# Patient Record
Sex: FEMALE | NOT HISPANIC OR LATINO | Employment: UNEMPLOYED | URBAN - NONMETROPOLITAN AREA
[De-identification: names, ages, dates, MRNs, and addresses within clinical notes are randomized per-mention and may not be internally consistent; named-entity substitution may affect disease eponyms.]

---

## 2023-07-12 LAB
ALANINE AMINOTRANSFERASE (SGPT) (U/L) IN SER/PLAS: 11 U/L (ref 7–45)
ALBUMIN (G/DL) IN SER/PLAS: 3.9 G/DL (ref 3.4–5)
ALKALINE PHOSPHATASE (U/L) IN SER/PLAS: 68 U/L (ref 33–110)
ANION GAP IN SER/PLAS: 15 MMOL/L (ref 10–20)
ASPARTATE AMINOTRANSFERASE (SGOT) (U/L) IN SER/PLAS: 11 U/L (ref 9–39)
BASOPHILS (10*3/UL) IN BLOOD BY AUTOMATED COUNT: 0.04 X10E9/L (ref 0–0.1)
BASOPHILS/100 LEUKOCYTES IN BLOOD BY AUTOMATED COUNT: 0.3 % (ref 0–2)
BILIRUBIN TOTAL (MG/DL) IN SER/PLAS: 0.4 MG/DL (ref 0–1.2)
C REACTIVE PROTEIN (MG/L) IN SER/PLAS: 5.54 MG/DL
CALCIUM (MG/DL) IN SER/PLAS: 8.9 MG/DL (ref 8.6–10.3)
CARBON DIOXIDE, TOTAL (MMOL/L) IN SER/PLAS: 23 MMOL/L (ref 21–32)
CHLORIDE (MMOL/L) IN SER/PLAS: 103 MMOL/L (ref 98–107)
CHOLESTEROL (MG/DL) IN SER/PLAS: 137 MG/DL (ref 0–199)
CHOLESTEROL IN HDL (MG/DL) IN SER/PLAS: 57.8 MG/DL
CHOLESTEROL/HDL RATIO: 2.4
CHORIOGONADOTROPIN (MIU/ML) IN SER/PLAS: <2 MIU/ML
CREATININE (MG/DL) IN SER/PLAS: 0.7 MG/DL (ref 0.5–1.05)
EOSINOPHILS (10*3/UL) IN BLOOD BY AUTOMATED COUNT: 0.11 X10E9/L (ref 0–0.7)
EOSINOPHILS/100 LEUKOCYTES IN BLOOD BY AUTOMATED COUNT: 0.9 % (ref 0–6)
ERYTHROCYTE DISTRIBUTION WIDTH (RATIO) BY AUTOMATED COUNT: 16.8 % (ref 11.5–14.5)
ERYTHROCYTE MEAN CORPUSCULAR HEMOGLOBIN CONCENTRATION (G/DL) BY AUTOMATED: 30.7 G/DL (ref 32–36)
ERYTHROCYTE MEAN CORPUSCULAR VOLUME (FL) BY AUTOMATED COUNT: 81 FL (ref 80–100)
ERYTHROCYTES (10*6/UL) IN BLOOD BY AUTOMATED COUNT: 4.3 X10E12/L (ref 4–5.2)
GFR FEMALE: >90 ML/MIN/1.73M2
GLUCOSE (MG/DL) IN SER/PLAS: 76 MG/DL (ref 74–99)
HEMATOCRIT (%) IN BLOOD BY AUTOMATED COUNT: 34.9 % (ref 36–46)
HEMOGLOBIN (G/DL) IN BLOOD: 10.7 G/DL (ref 12–16)
IMMATURE GRANULOCYTES/100 LEUKOCYTES IN BLOOD BY AUTOMATED COUNT: 0.4 % (ref 0–0.9)
IRON (UG/DL) IN SER/PLAS: 17 UG/DL (ref 35–150)
IRON BINDING CAPACITY (UG/DL) IN SER/PLAS: 374 UG/DL (ref 240–445)
IRON SATURATION (%) IN SER/PLAS: 5 % (ref 25–45)
LDL: 61 MG/DL (ref 0–99)
LEUKOCYTES (10*3/UL) IN BLOOD BY AUTOMATED COUNT: 11.6 X10E9/L (ref 4.4–11.3)
LYMPHOCYTES (10*3/UL) IN BLOOD BY AUTOMATED COUNT: 1.27 X10E9/L (ref 1.2–4.8)
LYMPHOCYTES/100 LEUKOCYTES IN BLOOD BY AUTOMATED COUNT: 11 % (ref 13–44)
MONOCYTES (10*3/UL) IN BLOOD BY AUTOMATED COUNT: 0.91 X10E9/L (ref 0.1–1)
MONOCYTES/100 LEUKOCYTES IN BLOOD BY AUTOMATED COUNT: 7.9 % (ref 2–10)
NATRIURETIC PEPTIDE B (PG/ML) IN SER/PLAS: 31 PG/ML (ref 0–99)
NEUTROPHILS (10*3/UL) IN BLOOD BY AUTOMATED COUNT: 9.21 X10E9/L (ref 1.2–7.7)
NEUTROPHILS/100 LEUKOCYTES IN BLOOD BY AUTOMATED COUNT: 79.5 % (ref 40–80)
PLATELETS (10*3/UL) IN BLOOD AUTOMATED COUNT: 318 X10E9/L (ref 150–450)
POTASSIUM (MMOL/L) IN SER/PLAS: 4.3 MMOL/L (ref 3.5–5.3)
PROTEIN TOTAL: 6.8 G/DL (ref 6.4–8.2)
SEDIMENTATION RATE, ERYTHROCYTE: 40 MM/H (ref 0–20)
SODIUM (MMOL/L) IN SER/PLAS: 137 MMOL/L (ref 136–145)
TRIGLYCERIDE (MG/DL) IN SER/PLAS: 93 MG/DL (ref 0–149)
UREA NITROGEN (MG/DL) IN SER/PLAS: 19 MG/DL (ref 6–23)
VLDL: 19 MG/DL (ref 0–40)

## 2023-07-13 LAB
ANTI-NUCLEAR ANTIBODY (ANA): NEGATIVE
CALCIDIOL (25 OH VITAMIN D3) (NG/ML) IN SER/PLAS: 29 NG/ML
COBALAMIN (VITAMIN B12) (PG/ML) IN SER/PLAS: 555 PG/ML (ref 211–911)
CYTOPLASMIC PATTERN: PRESENT
EBV INTERPRETATION: ABNORMAL
EPSTEIN-BARR VCA IGG: POSITIVE
EPSTEIN-BARR VCA IGG: POSITIVE
EPSTEIN-BARR VCA IGM: NEGATIVE
EPSTEIN-BARR VCA IGM: NEGATIVE
EPSTEIN-BARR VIRUS EARLY ANTIGEN ANTIBODY, IGG: NEGATIVE
EPSTIEN-BARR NUCLEAR ANTIGEN AB: POSITIVE
ESTIMATED AVERAGE GLUCOSE FOR HBA1C: 105 MG/DL
FOLLITROPIN (IU/L) IN SER/PLAS: 8.1 IU/L
HEMOGLOBIN A1C/HEMOGLOBIN TOTAL IN BLOOD: 5.3 %
STREPTOLYSIN O AB (IU/ML) IN SER/PLAS: <20 IU/ML (ref 0–200)

## 2023-07-15 LAB — B. BURGDORFERI VLSE1/PEPC10 ABS, ELISA: 0.36 IV

## 2023-11-27 ENCOUNTER — TELEPHONE (OUTPATIENT)
Dept: PRIMARY CARE | Facility: CLINIC | Age: 43
End: 2023-11-27
Payer: COMMERCIAL

## 2023-11-27 NOTE — TELEPHONE ENCOUNTER
Pt called office stating she needs a refill for metformin but would like to try something else for her weight loss. Pt is wondering what you would suggest?

## 2023-11-29 PROBLEM — E03.9 ACQUIRED HYPOTHYROIDISM: Status: ACTIVE | Noted: 2023-11-29

## 2023-11-29 PROBLEM — E88.810 METABOLIC SYNDROME: Status: ACTIVE | Noted: 2023-11-29

## 2023-11-29 PROBLEM — E55.9 VITAMIN D DEFICIENCY: Status: ACTIVE | Noted: 2023-11-29

## 2023-11-29 PROBLEM — D50.9 IRON DEFICIENCY ANEMIA: Status: ACTIVE | Noted: 2023-11-29

## 2023-11-29 PROBLEM — F41.9 ANXIETY: Status: ACTIVE | Noted: 2023-11-29

## 2023-11-29 PROBLEM — K57.92 DIVERTICULITIS: Status: ACTIVE | Noted: 2023-11-29

## 2023-12-01 ENCOUNTER — APPOINTMENT (OUTPATIENT)
Dept: PRIMARY CARE | Facility: CLINIC | Age: 43
End: 2023-12-01
Payer: COMMERCIAL

## 2023-12-07 PROBLEM — K42.9 UMBILICAL HERNIA: Status: ACTIVE | Noted: 2023-12-07

## 2023-12-07 PROBLEM — I83.90 SUPERFICIAL VARICOSITIES: Status: ACTIVE | Noted: 2023-12-07

## 2023-12-07 RX ORDER — METFORMIN HYDROCHLORIDE 500 MG/1
1 TABLET ORAL
COMMUNITY
End: 2023-12-12 | Stop reason: SDUPTHER

## 2023-12-07 RX ORDER — ONDANSETRON 4 MG/1
4 TABLET, FILM COATED ORAL EVERY 8 HOURS PRN
COMMUNITY
Start: 2023-07-16 | End: 2023-12-12 | Stop reason: ALTCHOICE

## 2023-12-07 RX ORDER — LEVOTHYROXINE SODIUM 175 UG/1
1 TABLET ORAL DAILY
COMMUNITY
Start: 2016-09-29 | End: 2023-12-12 | Stop reason: SDUPTHER

## 2023-12-07 RX ORDER — TRAZODONE HYDROCHLORIDE 50 MG/1
1 TABLET ORAL NIGHTLY PRN
COMMUNITY
Start: 2019-07-03 | End: 2023-12-12 | Stop reason: SDUPTHER

## 2023-12-07 RX ORDER — IBUPROFEN 200 MG
1 TABLET ORAL DAILY
COMMUNITY
Start: 2023-07-16 | End: 2023-12-12 | Stop reason: ALTCHOICE

## 2023-12-07 RX ORDER — METFORMIN HYDROCHLORIDE 500 MG/1
TABLET ORAL EVERY 12 HOURS
COMMUNITY
Start: 2023-04-27 | End: 2023-12-12 | Stop reason: SDUPTHER

## 2023-12-07 RX ORDER — FLUOXETINE HYDROCHLORIDE 20 MG/1
3 CAPSULE ORAL DAILY
COMMUNITY
Start: 2018-12-03 | End: 2024-01-09 | Stop reason: SDUPTHER

## 2023-12-12 ENCOUNTER — OFFICE VISIT (OUTPATIENT)
Dept: PRIMARY CARE | Facility: CLINIC | Age: 43
End: 2023-12-12
Payer: COMMERCIAL

## 2023-12-12 VITALS
TEMPERATURE: 98 F | OXYGEN SATURATION: 99 % | HEIGHT: 63 IN | HEART RATE: 61 BPM | BODY MASS INDEX: 35.47 KG/M2 | SYSTOLIC BLOOD PRESSURE: 112 MMHG | DIASTOLIC BLOOD PRESSURE: 66 MMHG | WEIGHT: 200.2 LBS

## 2023-12-12 DIAGNOSIS — E66.09 CLASS 2 OBESITY DUE TO EXCESS CALORIES WITHOUT SERIOUS COMORBIDITY WITH BODY MASS INDEX (BMI) OF 35.0 TO 35.9 IN ADULT: ICD-10-CM

## 2023-12-12 DIAGNOSIS — E88.810 METABOLIC SYNDROME: Primary | ICD-10-CM

## 2023-12-12 DIAGNOSIS — E03.9 ACQUIRED HYPOTHYROIDISM: ICD-10-CM

## 2023-12-12 DIAGNOSIS — F41.9 ANXIETY: ICD-10-CM

## 2023-12-12 DIAGNOSIS — F51.01 PRIMARY INSOMNIA: ICD-10-CM

## 2023-12-12 PROCEDURE — 1036F TOBACCO NON-USER: CPT | Performed by: NURSE PRACTITIONER

## 2023-12-12 PROCEDURE — 99214 OFFICE O/P EST MOD 30 MIN: CPT | Performed by: NURSE PRACTITIONER

## 2023-12-12 PROCEDURE — 3008F BODY MASS INDEX DOCD: CPT | Performed by: NURSE PRACTITIONER

## 2023-12-12 RX ORDER — PHENTERMINE HYDROCHLORIDE 37.5 MG/1
37.5 TABLET ORAL
Qty: 30 TABLET | Refills: 0 | Status: SHIPPED | OUTPATIENT
Start: 2023-12-12 | End: 2024-01-09 | Stop reason: SDUPTHER

## 2023-12-12 RX ORDER — TRAZODONE HYDROCHLORIDE 50 MG/1
50 TABLET ORAL NIGHTLY PRN
Qty: 90 TABLET | Refills: 3 | Status: SHIPPED | OUTPATIENT
Start: 2023-12-12 | End: 2024-12-11

## 2023-12-12 RX ORDER — METFORMIN HYDROCHLORIDE 500 MG/1
1000 TABLET ORAL
Qty: 180 TABLET | Refills: 3 | Status: SHIPPED | OUTPATIENT
Start: 2023-12-12

## 2023-12-12 RX ORDER — LEVOTHYROXINE SODIUM 175 UG/1
175 TABLET ORAL DAILY
Qty: 90 TABLET | Refills: 3 | Status: SHIPPED | OUTPATIENT
Start: 2023-12-12 | End: 2024-01-25 | Stop reason: ALTCHOICE

## 2023-12-12 ASSESSMENT — LIFESTYLE VARIABLES
HOW OFTEN DO YOU HAVE A DRINK CONTAINING ALCOHOL: NEVER
AUDIT-C TOTAL SCORE: 0
HOW MANY STANDARD DRINKS CONTAINING ALCOHOL DO YOU HAVE ON A TYPICAL DAY: PATIENT DOES NOT DRINK
HOW OFTEN DO YOU HAVE SIX OR MORE DRINKS ON ONE OCCASION: NEVER
SKIP TO QUESTIONS 9-10: 1

## 2023-12-12 ASSESSMENT — ENCOUNTER SYMPTOMS
RESPIRATORY NEGATIVE: 1
PSYCHIATRIC NEGATIVE: 1
DEPRESSION: 0
LOSS OF SENSATION IN FEET: 0
EYES NEGATIVE: 1
GASTROINTESTINAL NEGATIVE: 1
CONSTITUTIONAL NEGATIVE: 1
OCCASIONAL FEELINGS OF UNSTEADINESS: 0
NEUROLOGICAL NEGATIVE: 1
CARDIOVASCULAR NEGATIVE: 1
ALLERGIC/IMMUNOLOGIC NEGATIVE: 1
MUSCULOSKELETAL NEGATIVE: 1
ENDOCRINE NEGATIVE: 1

## 2023-12-12 ASSESSMENT — PATIENT HEALTH QUESTIONNAIRE - PHQ9
2. FEELING DOWN, DEPRESSED OR HOPELESS: NOT AT ALL
SUM OF ALL RESPONSES TO PHQ9 QUESTIONS 1 AND 2: 0
1. LITTLE INTEREST OR PLEASURE IN DOING THINGS: NOT AT ALL

## 2023-12-12 NOTE — PROGRESS NOTES
Seen in collaboration with nurse practitioner student.  Assessment and HPI updated to reflect my assessment and HPI.

## 2023-12-12 NOTE — PROGRESS NOTES
"Subjective   Patient ID: Tiny Malik is a 43 y.o. female who presents for Follow-up.    Pt presents for discussion on weight loss. Pt sees a  once a week. Pt stopped her metformin three weeks ago and feels like it is not doing anything and would like to try an injection for weight loss.  Patient denies any active participation and exercise.  She does note that she has been trying to cut back on her intake.  Anecdotally she notes that since stopping her metformin she has had an increase in appetite.  After discussion of different available weight loss medications, cost, side effects and potential actions.  She is agreeable to try Adipex.  Reviewed importance of food choices exercise and medication in the weight loss journey.  She verbalizes understanding.  Of note she has lost approximately 81 pounds in the last 2 years.  Celebrated this accomplishment with patient.         Review of Systems   Constitutional: Negative.    HENT: Negative.     Eyes: Negative.    Respiratory: Negative.     Cardiovascular: Negative.    Gastrointestinal: Negative.    Endocrine: Negative.    Genitourinary: Negative.    Musculoskeletal: Negative.    Skin: Negative.    Allergic/Immunologic: Negative.    Neurological: Negative.    Psychiatric/Behavioral: Negative.         Objective   /66 (BP Location: Right arm, Patient Position: Sitting)   Pulse 61   Temp 36.7 °C (98 °F) (Temporal)   Ht 1.6 m (5' 3\")   Wt 90.8 kg (200 lb 3.2 oz)   SpO2 99%   BMI 35.46 kg/m²     Physical Exam  Constitutional:       Appearance: Normal appearance.   HENT:      Head: Normocephalic and atraumatic.      Right Ear: Tympanic membrane normal.      Left Ear: Tympanic membrane normal.      Nose: Nose normal.      Mouth/Throat:      Mouth: Mucous membranes are moist.   Eyes:      Pupils: Pupils are equal, round, and reactive to light.   Cardiovascular:      Rate and Rhythm: Regular rhythm.      Pulses: Normal pulses.      Heart sounds: " Normal heart sounds.   Pulmonary:      Effort: Pulmonary effort is normal.      Breath sounds: Normal breath sounds.   Abdominal:      General: Abdomen is flat.      Palpations: Abdomen is soft.   Musculoskeletal:         General: Normal range of motion.      Cervical back: Normal range of motion.   Skin:     General: Skin is warm.      Capillary Refill: Capillary refill takes less than 2 seconds.   Neurological:      General: No focal deficit present.      Mental Status: She is alert and oriented to person, place, and time.   Psychiatric:         Mood and Affect: Mood normal.         Behavior: Behavior normal.         Thought Content: Thought content normal.         Judgment: Judgment normal.       Assessment/Plan   Problem List Items Addressed This Visit             ICD-10-CM    Acquired hypothyroidism E03.9    Relevant Medications    levothyroxine (Synthroid, Levoxyl) 175 mcg tablet    Anxiety F41.9    Metabolic syndrome - Primary E88.810    Relevant Medications    metFORMIN (Glucophage) 500 mg tablet     Other Visit Diagnoses         Codes    Class 2 obesity due to excess calories without serious comorbidity with body mass index (BMI) of 35.0 to 35.9 in adult     E66.09, Z68.35    Relevant Medications    metFORMIN (Glucophage) 500 mg tablet    phentermine (Adipex-P) 37.5 mg tablet    Primary insomnia     F51.01    Relevant Medications    traZODone (Desyrel) 50 mg tablet        Focus on healthy eating including lean proteins and vegetables.  Avoid high carbohydrate high sugar foods and drinks.  Try to increase physical activity as tolerated.  Goal is for 160 minutes/week of physical activity.  Start Adipex once a day.   Continue metformin

## 2023-12-12 NOTE — PATIENT INSTRUCTIONS
Focus on healthy eating including lean proteins and vegetables.  Avoid high carbohydrate high sugar foods and drinks.  Try to increase physical activity as tolerated.  Goal is for 160 minutes/week of physical activity.  Start Adipex once a day.   Continue metformin

## 2024-01-09 DIAGNOSIS — E66.09 CLASS 2 OBESITY DUE TO EXCESS CALORIES WITHOUT SERIOUS COMORBIDITY WITH BODY MASS INDEX (BMI) OF 35.0 TO 35.9 IN ADULT: ICD-10-CM

## 2024-01-09 DIAGNOSIS — F41.9 ANXIETY: Primary | ICD-10-CM

## 2024-01-09 RX ORDER — PHENTERMINE HYDROCHLORIDE 37.5 MG/1
37.5 TABLET ORAL
Qty: 30 TABLET | Refills: 0 | Status: SHIPPED | OUTPATIENT
Start: 2024-01-09 | End: 2024-02-14 | Stop reason: SDUPTHER

## 2024-01-09 RX ORDER — FLUOXETINE HYDROCHLORIDE 20 MG/1
60 CAPSULE ORAL DAILY
Qty: 90 CAPSULE | Refills: 11 | Status: SHIPPED | OUTPATIENT
Start: 2024-01-09 | End: 2025-01-08

## 2024-01-09 NOTE — TELEPHONE ENCOUNTER
Last DOS 12-12-23 next DOS 1-12-24   conducted a detailed discussion... I had a detailed discussion with the patient and/or guardian regarding the historical points, exam findings, and any diagnostic results supporting the discharge/admit diagnosis.

## 2024-01-18 ENCOUNTER — APPOINTMENT (OUTPATIENT)
Dept: PRIMARY CARE | Facility: CLINIC | Age: 44
End: 2024-01-18
Payer: COMMERCIAL

## 2024-01-18 ENCOUNTER — OFFICE VISIT (OUTPATIENT)
Dept: PRIMARY CARE | Facility: CLINIC | Age: 44
End: 2024-01-18
Payer: COMMERCIAL

## 2024-01-18 VITALS
WEIGHT: 184.4 LBS | BODY MASS INDEX: 32.67 KG/M2 | HEIGHT: 63 IN | OXYGEN SATURATION: 97 % | TEMPERATURE: 98.4 F | HEART RATE: 71 BPM | SYSTOLIC BLOOD PRESSURE: 110 MMHG | DIASTOLIC BLOOD PRESSURE: 68 MMHG

## 2024-01-18 DIAGNOSIS — M25.50 POLYARTHRALGIA: ICD-10-CM

## 2024-01-18 DIAGNOSIS — F41.9 ANXIETY: ICD-10-CM

## 2024-01-18 DIAGNOSIS — Z01.89 ENCOUNTER FOR ROUTINE LABORATORY TESTING: ICD-10-CM

## 2024-01-18 DIAGNOSIS — R53.82 CHRONIC FATIGUE: ICD-10-CM

## 2024-01-18 DIAGNOSIS — D50.8 OTHER IRON DEFICIENCY ANEMIA: Primary | ICD-10-CM

## 2024-01-18 LAB
ALBUMIN SERPL BCP-MCNC: 4.3 G/DL (ref 3.4–5)
ALP SERPL-CCNC: 59 U/L (ref 33–110)
ALT SERPL W P-5'-P-CCNC: 14 U/L (ref 7–45)
ANION GAP SERPL CALC-SCNC: 13 MMOL/L (ref 10–20)
AST SERPL W P-5'-P-CCNC: 13 U/L (ref 9–39)
BASOPHILS # BLD AUTO: 0.02 X10*3/UL (ref 0–0.1)
BASOPHILS NFR BLD AUTO: 0.2 %
BILIRUB SERPL-MCNC: 0.3 MG/DL (ref 0–1.2)
BUN SERPL-MCNC: 18 MG/DL (ref 6–23)
CALCIUM SERPL-MCNC: 9.3 MG/DL (ref 8.6–10.3)
CHLORIDE SERPL-SCNC: 103 MMOL/L (ref 98–107)
CK SERPL-CCNC: 28 U/L (ref 0–215)
CO2 SERPL-SCNC: 24 MMOL/L (ref 21–32)
CREAT SERPL-MCNC: 0.6 MG/DL (ref 0.5–1.05)
EGFRCR SERPLBLD CKD-EPI 2021: >90 ML/MIN/1.73M*2
EOSINOPHIL # BLD AUTO: 0 X10*3/UL (ref 0–0.7)
EOSINOPHIL NFR BLD AUTO: 0 %
ERYTHROCYTE [DISTWIDTH] IN BLOOD BY AUTOMATED COUNT: 15.2 % (ref 11.5–14.5)
ERYTHROCYTE [SEDIMENTATION RATE] IN BLOOD BY WESTERGREN METHOD: 35 MM/H (ref 0–20)
FERRITIN SERPL-MCNC: 15 NG/ML (ref 8–150)
GLUCOSE SERPL-MCNC: 113 MG/DL (ref 74–99)
HCT VFR BLD AUTO: 35.3 % (ref 36–46)
HGB BLD-MCNC: 11 G/DL (ref 12–16)
IMM GRANULOCYTES # BLD AUTO: 0.03 X10*3/UL (ref 0–0.7)
IMM GRANULOCYTES NFR BLD AUTO: 0.3 % (ref 0–0.9)
IRON SATN MFR SERPL: 4 % (ref 25–45)
IRON SERPL-MCNC: 16 UG/DL (ref 35–150)
LYMPHOCYTES # BLD AUTO: 0.92 X10*3/UL (ref 1.2–4.8)
LYMPHOCYTES NFR BLD AUTO: 10.1 %
MCH RBC QN AUTO: 24.7 PG (ref 26–34)
MCHC RBC AUTO-ENTMCNC: 31.2 G/DL (ref 32–36)
MCV RBC AUTO: 79 FL (ref 80–100)
MONOCYTES # BLD AUTO: 0.44 X10*3/UL (ref 0.1–1)
MONOCYTES NFR BLD AUTO: 4.8 %
NEUTROPHILS # BLD AUTO: 7.72 X10*3/UL (ref 1.2–7.7)
NEUTROPHILS NFR BLD AUTO: 84.6 %
NRBC BLD-RTO: 0 /100 WBCS (ref 0–0)
PLATELET # BLD AUTO: 401 X10*3/UL (ref 150–450)
POTASSIUM SERPL-SCNC: 4 MMOL/L (ref 3.5–5.3)
PROT SERPL-MCNC: 7.4 G/DL (ref 6.4–8.2)
RBC # BLD AUTO: 4.45 X10*6/UL (ref 4–5.2)
SODIUM SERPL-SCNC: 136 MMOL/L (ref 136–145)
T4 FREE SERPL-MCNC: 1.36 NG/DL (ref 0.61–1.12)
TIBC SERPL-MCNC: 422 UG/DL (ref 240–445)
TSH SERPL-ACNC: 0.03 MIU/L (ref 0.44–3.98)
UIBC SERPL-MCNC: 406 UG/DL (ref 110–370)
WBC # BLD AUTO: 9.1 X10*3/UL (ref 4.4–11.3)

## 2024-01-18 PROCEDURE — 80053 COMPREHEN METABOLIC PANEL: CPT

## 2024-01-18 PROCEDURE — 86431 RHEUMATOID FACTOR QUANT: CPT

## 2024-01-18 PROCEDURE — 83540 ASSAY OF IRON: CPT

## 2024-01-18 PROCEDURE — 3008F BODY MASS INDEX DOCD: CPT | Performed by: FAMILY MEDICINE

## 2024-01-18 PROCEDURE — 36415 COLL VENOUS BLD VENIPUNCTURE: CPT

## 2024-01-18 PROCEDURE — 99214 OFFICE O/P EST MOD 30 MIN: CPT | Performed by: FAMILY MEDICINE

## 2024-01-18 PROCEDURE — 84443 ASSAY THYROID STIM HORMONE: CPT

## 2024-01-18 PROCEDURE — 85652 RBC SED RATE AUTOMATED: CPT

## 2024-01-18 PROCEDURE — 84439 ASSAY OF FREE THYROXINE: CPT

## 2024-01-18 PROCEDURE — 86038 ANTINUCLEAR ANTIBODIES: CPT

## 2024-01-18 PROCEDURE — 82728 ASSAY OF FERRITIN: CPT

## 2024-01-18 PROCEDURE — 82746 ASSAY OF FOLIC ACID SERUM: CPT

## 2024-01-18 PROCEDURE — 1036F TOBACCO NON-USER: CPT | Performed by: FAMILY MEDICINE

## 2024-01-18 PROCEDURE — 82607 VITAMIN B-12: CPT

## 2024-01-18 PROCEDURE — 83550 IRON BINDING TEST: CPT

## 2024-01-18 PROCEDURE — 82550 ASSAY OF CK (CPK): CPT

## 2024-01-18 PROCEDURE — 85025 COMPLETE CBC W/AUTO DIFF WBC: CPT

## 2024-01-18 RX ORDER — PREDNISONE 10 MG/1
TABLET ORAL
Qty: 84 TABLET | Refills: 0 | Status: SHIPPED | OUTPATIENT
Start: 2024-01-18 | End: 2024-02-22

## 2024-01-18 RX ORDER — DEXAMETHASONE 6 MG/1
6 TABLET ORAL DAILY
COMMUNITY
Start: 2024-01-11 | End: 2024-03-27 | Stop reason: WASHOUT

## 2024-01-18 ASSESSMENT — ENCOUNTER SYMPTOMS
ARTHRALGIAS: 1
ABDOMINAL PAIN: 1
COUGH: 0
JOINT SWELLING: 1
FATIGUE: 1
HEADACHES: 0
SHORTNESS OF BREATH: 0
NAUSEA: 0
PALPITATIONS: 0
FREQUENCY: 0
BRUISES/BLEEDS EASILY: 0
DIZZINESS: 0
CONSTIPATION: 0
MYALGIAS: 1
DIARRHEA: 0
NERVOUS/ANXIOUS: 1
CHILLS: 0
DYSURIA: 0
DYSPHORIC MOOD: 0
HEMATURIA: 0
VOMITING: 0
FEVER: 0
SORE THROAT: 0
WHEEZING: 0

## 2024-01-18 ASSESSMENT — LIFESTYLE VARIABLES: HOW OFTEN DO YOU HAVE A DRINK CONTAINING ALCOHOL: NEVER

## 2024-01-18 ASSESSMENT — PAIN SCALES - GENERAL: PAINLEVEL: 4

## 2024-01-18 ASSESSMENT — PATIENT HEALTH QUESTIONNAIRE - PHQ9
SUM OF ALL RESPONSES TO PHQ9 QUESTIONS 1 AND 2: 0
1. LITTLE INTEREST OR PLEASURE IN DOING THINGS: NOT AT ALL
2. FEELING DOWN, DEPRESSED OR HOPELESS: NOT AT ALL

## 2024-01-18 NOTE — PROGRESS NOTES
"Subjective   Patient ID: Tiny Malik is a 43 y.o. female who presents for Fatigue and Generalized Body Aches.    HPI here for worsening fatigue with inability to walk and multiple joints hurting in various times and areas. No rashes. She received dexamethasone for mono infection and reports this helps her symptoms. In addition she was noted to be anemic last summer, does not recal anyone telling her anything about it and no rechecks done since then. She has left sided lower quadrant pain intermittently as well, not related to position or eting. She is in an intentional weight loss program.     Review of Systems   Constitutional:  Positive for fatigue. Negative for chills and fever.   HENT:  Positive for postnasal drip. Negative for congestion, ear pain, hearing loss, sore throat and tinnitus.    Eyes:  Negative for visual disturbance.   Respiratory:  Negative for cough, shortness of breath and wheezing.    Cardiovascular:  Negative for chest pain and palpitations.   Gastrointestinal:  Positive for abdominal pain. Negative for constipation, diarrhea, nausea and vomiting.   Endocrine: Negative for polyuria.   Genitourinary:  Negative for dysuria, frequency, hematuria and urgency.   Musculoskeletal:  Positive for arthralgias, joint swelling and myalgias. Negative for gait problem.   Skin:  Negative for rash.   Neurological:  Negative for dizziness, syncope and headaches.   Hematological:  Does not bruise/bleed easily.   Psychiatric/Behavioral:  Negative for dysphoric mood. The patient is nervous/anxious.        Objective   /68   Pulse 71   Temp 36.9 °C (98.4 °F)   Ht 1.6 m (5' 3\")   Wt 83.6 kg (184 lb 6.4 oz)   SpO2 97%   BMI 32.66 kg/m²     Physical Exam  Vitals reviewed.   Constitutional:       General: She is not in acute distress.     Appearance: Normal appearance.   HENT:      Head: Normocephalic.      Right Ear: Tympanic membrane, ear canal and external ear normal.      Left Ear: Tympanic " membrane, ear canal and external ear normal.      Nose: Nose normal.      Mouth/Throat:      Mouth: Mucous membranes are moist.      Pharynx: Oropharynx is clear.   Eyes:      Extraocular Movements: Extraocular movements intact.      Conjunctiva/sclera: Conjunctivae normal.      Pupils: Pupils are equal, round, and reactive to light.   Cardiovascular:      Rate and Rhythm: Normal rate and regular rhythm.      Pulses: Normal pulses.      Heart sounds: No murmur heard.  Pulmonary:      Effort: Pulmonary effort is normal.      Breath sounds: Normal breath sounds. No wheezing or rhonchi.   Abdominal:      General: Bowel sounds are normal. There is no distension.      Palpations: Abdomen is soft.      Tenderness: There is no abdominal tenderness. There is no rebound.   Musculoskeletal:      Right lower leg: No edema.      Left lower leg: No edema.   Skin:     General: Skin is warm.      Coloration: Skin is not jaundiced.   Neurological:      General: No focal deficit present.      Mental Status: She is alert and oriented to person, place, and time.      Cranial Nerves: No cranial nerve deficit.      Gait: Gait normal.   Psychiatric:         Mood and Affect: Mood normal.         Assessment/Plan   Problem List Items Addressed This Visit             ICD-10-CM    Anxiety F41.9    Iron deficiency anemia - Primary D50.9    Relevant Orders    CBC and Auto Differential    Iron and TIBC    Ferritin     Other Visit Diagnoses         Codes    Chronic fatigue     R53.82    Relevant Orders    Comprehensive Metabolic Panel    Thyroid Stimulating Hormone    Thyroxine, Free    Vitamin B12    Folate    CK    Encounter for routine laboratory testing     Z01.89    Polyarthralgia     M25.50    Relevant Medications    predniSONE (Deltasone) 10 mg tablet    Other Relevant Orders    GERALD with Reflex to HINA    Sedimentation Rate    Rheumatoid factor          Labs ordered, consider ct abdomen versus cscope pending results. Prednisone taper  ordered.

## 2024-01-19 LAB
ANA SER QL HEP2 SUBST: NEGATIVE
CYTOPLASMIC PATTERN: PRESENT
FOLATE SERPL-MCNC: 13 NG/ML
RHEUMATOID FACT SER NEPH-ACNC: >600 IU/ML (ref 0–15)
VIT B12 SERPL-MCNC: 805 PG/ML (ref 211–911)

## 2024-01-22 ENCOUNTER — TELEPHONE (OUTPATIENT)
Dept: PRIMARY CARE | Facility: CLINIC | Age: 44
End: 2024-01-22
Payer: COMMERCIAL

## 2024-01-22 NOTE — TELEPHONE ENCOUNTER
Reviewed lab results with patient and states that she doesn't see any other provider for her thyroid.  Could you decrease her dose and send it to the pharmacy. PL

## 2024-01-25 DIAGNOSIS — E03.9 ACQUIRED HYPOTHYROIDISM: Primary | ICD-10-CM

## 2024-01-25 RX ORDER — LEVOTHYROXINE SODIUM 150 UG/1
150 TABLET ORAL DAILY
Qty: 90 TABLET | Refills: 3 | Status: SHIPPED | OUTPATIENT
Start: 2024-01-25 | End: 2025-01-24

## 2024-02-14 DIAGNOSIS — E66.09 CLASS 2 OBESITY DUE TO EXCESS CALORIES WITHOUT SERIOUS COMORBIDITY WITH BODY MASS INDEX (BMI) OF 35.0 TO 35.9 IN ADULT: ICD-10-CM

## 2024-02-14 RX ORDER — PHENTERMINE HYDROCHLORIDE 37.5 MG/1
37.5 TABLET ORAL
Qty: 30 TABLET | Refills: 0 | Status: SHIPPED | OUTPATIENT
Start: 2024-02-14 | End: 2024-03-25 | Stop reason: SDUPTHER

## 2024-02-20 ENCOUNTER — APPOINTMENT (OUTPATIENT)
Dept: RHEUMATOLOGY | Facility: CLINIC | Age: 44
End: 2024-02-20
Payer: COMMERCIAL

## 2024-03-20 ENCOUNTER — TELEPHONE (OUTPATIENT)
Dept: PRIMARY CARE | Facility: CLINIC | Age: 44
End: 2024-03-20
Payer: COMMERCIAL

## 2024-03-20 NOTE — TELEPHONE ENCOUNTER
Pt called in stated she thinks she has poison ivy or poison oak she was in the woods clearing brush and it is all over her arms, hands, legs, face, back, stomach.  I offered her an appt tomorrow at 2 and she said she had another dr appt.  She asked what she could get over the counter ivy dry.  Pt said she would get that and see.

## 2024-03-25 DIAGNOSIS — E66.09 CLASS 2 OBESITY DUE TO EXCESS CALORIES WITHOUT SERIOUS COMORBIDITY WITH BODY MASS INDEX (BMI) OF 35.0 TO 35.9 IN ADULT: ICD-10-CM

## 2024-03-26 RX ORDER — PHENTERMINE HYDROCHLORIDE 37.5 MG/1
37.5 TABLET ORAL
Qty: 30 TABLET | Refills: 0 | Status: SHIPPED | OUTPATIENT
Start: 2024-03-26 | End: 2024-04-23 | Stop reason: SDUPTHER

## 2024-03-27 ENCOUNTER — LAB (OUTPATIENT)
Dept: LAB | Facility: LAB | Age: 44
End: 2024-03-27
Payer: COMMERCIAL

## 2024-03-27 ENCOUNTER — HOSPITAL ENCOUNTER (OUTPATIENT)
Dept: RADIOLOGY | Facility: CLINIC | Age: 44
Discharge: HOME | End: 2024-03-27
Payer: COMMERCIAL

## 2024-03-27 ENCOUNTER — OFFICE VISIT (OUTPATIENT)
Dept: RHEUMATOLOGY | Facility: CLINIC | Age: 44
End: 2024-03-27
Payer: COMMERCIAL

## 2024-03-27 VITALS
TEMPERATURE: 97.3 F | HEIGHT: 64 IN | BODY MASS INDEX: 30.2 KG/M2 | WEIGHT: 176.9 LBS | DIASTOLIC BLOOD PRESSURE: 61 MMHG | SYSTOLIC BLOOD PRESSURE: 97 MMHG | HEART RATE: 69 BPM

## 2024-03-27 DIAGNOSIS — M05.79 RHEUMATOID ARTHRITIS INVOLVING MULTIPLE SITES WITH POSITIVE RHEUMATOID FACTOR (MULTI): ICD-10-CM

## 2024-03-27 DIAGNOSIS — M05.79 RHEUMATOID ARTHRITIS INVOLVING MULTIPLE SITES WITH POSITIVE RHEUMATOID FACTOR (MULTI): Primary | ICD-10-CM

## 2024-03-27 LAB
ALBUMIN SERPL BCP-MCNC: 3.9 G/DL (ref 3.4–5)
ALP SERPL-CCNC: 56 U/L (ref 33–110)
ALT SERPL W P-5'-P-CCNC: 17 U/L (ref 7–45)
ANION GAP SERPL CALC-SCNC: 13 MMOL/L (ref 10–20)
AST SERPL W P-5'-P-CCNC: 14 U/L (ref 9–39)
BASOPHILS # BLD AUTO: 0.05 X10*3/UL (ref 0–0.1)
BASOPHILS NFR BLD AUTO: 0.6 %
BILIRUB SERPL-MCNC: 0.2 MG/DL (ref 0–1.2)
BUN SERPL-MCNC: 25 MG/DL (ref 6–23)
CALCIUM SERPL-MCNC: 9.4 MG/DL (ref 8.6–10.6)
CHLORIDE SERPL-SCNC: 105 MMOL/L (ref 98–107)
CO2 SERPL-SCNC: 27 MMOL/L (ref 21–32)
CREAT SERPL-MCNC: 0.77 MG/DL (ref 0.5–1.05)
EGFRCR SERPLBLD CKD-EPI 2021: >90 ML/MIN/1.73M*2
EOSINOPHIL # BLD AUTO: 0.17 X10*3/UL (ref 0–0.7)
EOSINOPHIL NFR BLD AUTO: 2 %
ERYTHROCYTE [DISTWIDTH] IN BLOOD BY AUTOMATED COUNT: 16.5 % (ref 11.5–14.5)
GLUCOSE SERPL-MCNC: 103 MG/DL (ref 74–99)
HBV CORE AB SER QL: NONREACTIVE
HBV SURFACE AB SER-ACNC: <3.1 MIU/ML
HBV SURFACE AG SERPL QL IA: NONREACTIVE
HCT VFR BLD AUTO: 37.6 % (ref 36–46)
HGB BLD-MCNC: 11.7 G/DL (ref 12–16)
IMM GRANULOCYTES # BLD AUTO: 0.03 X10*3/UL (ref 0–0.7)
IMM GRANULOCYTES NFR BLD AUTO: 0.3 % (ref 0–0.9)
LYMPHOCYTES # BLD AUTO: 1.65 X10*3/UL (ref 1.2–4.8)
LYMPHOCYTES NFR BLD AUTO: 19.1 %
MCH RBC QN AUTO: 26.5 PG (ref 26–34)
MCHC RBC AUTO-ENTMCNC: 31.1 G/DL (ref 32–36)
MCV RBC AUTO: 85 FL (ref 80–100)
MONOCYTES # BLD AUTO: 0.52 X10*3/UL (ref 0.1–1)
MONOCYTES NFR BLD AUTO: 6 %
NEUTROPHILS # BLD AUTO: 6.2 X10*3/UL (ref 1.2–7.7)
NEUTROPHILS NFR BLD AUTO: 72 %
NRBC BLD-RTO: 0 /100 WBCS (ref 0–0)
PLATELET # BLD AUTO: 325 X10*3/UL (ref 150–450)
POTASSIUM SERPL-SCNC: 3.9 MMOL/L (ref 3.5–5.3)
PROT SERPL-MCNC: 6.6 G/DL (ref 6.4–8.2)
RBC # BLD AUTO: 4.41 X10*6/UL (ref 4–5.2)
SODIUM SERPL-SCNC: 141 MMOL/L (ref 136–145)
WBC # BLD AUTO: 8.6 X10*3/UL (ref 4.4–11.3)

## 2024-03-27 PROCEDURE — 36415 COLL VENOUS BLD VENIPUNCTURE: CPT

## 2024-03-27 PROCEDURE — 86481 TB AG RESPONSE T-CELL SUSP: CPT

## 2024-03-27 PROCEDURE — 86200 CCP ANTIBODY: CPT

## 2024-03-27 PROCEDURE — 3008F BODY MASS INDEX DOCD: CPT | Performed by: STUDENT IN AN ORGANIZED HEALTH CARE EDUCATION/TRAINING PROGRAM

## 2024-03-27 PROCEDURE — 71046 X-RAY EXAM CHEST 2 VIEWS: CPT | Performed by: STUDENT IN AN ORGANIZED HEALTH CARE EDUCATION/TRAINING PROGRAM

## 2024-03-27 PROCEDURE — 73130 X-RAY EXAM OF HAND: CPT | Mod: 50

## 2024-03-27 PROCEDURE — 86706 HEP B SURFACE ANTIBODY: CPT

## 2024-03-27 PROCEDURE — 71046 X-RAY EXAM CHEST 2 VIEWS: CPT

## 2024-03-27 PROCEDURE — 80053 COMPREHEN METABOLIC PANEL: CPT

## 2024-03-27 PROCEDURE — 86704 HEP B CORE ANTIBODY TOTAL: CPT

## 2024-03-27 PROCEDURE — 99205 OFFICE O/P NEW HI 60 MIN: CPT | Performed by: STUDENT IN AN ORGANIZED HEALTH CARE EDUCATION/TRAINING PROGRAM

## 2024-03-27 PROCEDURE — 99215 OFFICE O/P EST HI 40 MIN: CPT | Mod: GC | Performed by: STUDENT IN AN ORGANIZED HEALTH CARE EDUCATION/TRAINING PROGRAM

## 2024-03-27 PROCEDURE — 87340 HEPATITIS B SURFACE AG IA: CPT

## 2024-03-27 PROCEDURE — 85025 COMPLETE CBC W/AUTO DIFF WBC: CPT

## 2024-03-27 PROCEDURE — 73130 X-RAY EXAM OF HAND: CPT | Mod: BILATERAL PROCEDURE | Performed by: STUDENT IN AN ORGANIZED HEALTH CARE EDUCATION/TRAINING PROGRAM

## 2024-03-27 PROCEDURE — 86803 HEPATITIS C AB TEST: CPT

## 2024-03-27 RX ORDER — TURMERIC 400 MG
CAPSULE ORAL
COMMUNITY

## 2024-03-27 RX ORDER — PREDNISONE 20 MG/1
TABLET ORAL
COMMUNITY
Start: 2024-03-20 | End: 2024-04-01 | Stop reason: ALTCHOICE

## 2024-03-27 ASSESSMENT — PAIN SCALES - GENERAL: PAINLEVEL: 0-NO PAIN

## 2024-03-27 NOTE — ADDENDUM NOTE
Addended by: SCOOBY JUAREZ on: 3/27/2024 03:37 PM     Modules accepted: Level of Service     RN Diabetes Education Progress Note    Reason for Visit: Follow Up Diabetes Education     Highlights of today's visit:    Continue with Metformin  mg tabs, one tab daily with breakfast  Lantus 10 units every night     1. Limit carbohydrate intake to 30 grams per meal and 15 grams per snack  2. Avoid any sweetened beverages including juices, regular soda, gatorade, etc.  Choose any sugar free or unsweetened beverage  3. Use Plate method for meals:  1/4 plate is meat (protein), 1/4 plate is starch, and 1/2 the plate is filled with non-starchy vegetables  4. Include a serving of protein at each meal and snack (meat, cheese, eggs, peanut butter, nuts, beans/legumes)   5. Aim for 30 to 60 minutes of exercise 3 days per week  6. Test two times a day, fasting and then alternating times between before meals, 2 hr after eating or bedtime  7. Ask [facility] when dentist comes to visit or go through [facility] for dental appointment  8. Follow up with 3diabetes education  a 1 pm    Current Medication:  Verified  Metformin  mg tabs, one tab daily with breakfast  Lantus 10 units every night    Assessment of Blood Sugars:   Patient reports blood glucose values from log book  Testing times and results: Fasting: Values:  [phone]  Pre lunch: [phone]  Target glucose levels reviewed:  Fastin-130 mg/dl , Post Meal:  <150 mg/dl  and HS:  100-150 mg/dl      Acute and Chronic Complications  Patient has not experienced hypoglycemia  Reviewed 15/15 rule to treat hypoglycemia  Patient does not carry fast acting sugar.     Instructed to carry fast acting sugar    The patient was seen for Diabetes Self-Management Education in an individual setting.   The patient was seen from 1:30  to 2:15 and billed for 30 minutes. Relevant medical history reviewed.  The instruction was given to the patient.    Material was presented using verbal, written, demonstration and return  demonstration.    Learning needs were assessed and the patient requires education in diabetes disease process/treatment options, medical nutrition therapy, physical activity, blood glucose monitoring, diabetes medications, acute complications and chronic complications.     Labs:  Hemoglobin A1C: target value and patient current value reviewed   Component      Latest Ref Rng & Units 6/24/2020 9/17/2020 2/4/2021 5/19/2021   GLYCOHEMOGLOBIN A1C      4.5 - 5.6 % 7.8 (H) 6.5 (H) 6.4 (H) 5.9 (H)   Est Avg Glucose      mg/dL         Component      Latest Ref Rng & Units 7/28/2021 8/7/2021   GLYCOHEMOGLOBIN A1C      4.5 - 5.6 % 5.3 5.5   Est Avg Glucose      mg/dL 105      Component      Latest Ref Rng & Units 8/7/2021 3/21/2022   GLYCOHEMOGLOBIN A1C      % 5.5 6.8     Lipid panel:  No new values    Microalbumin:    Component      Latest Ref Rng & Units 3/23/2022   URINE MICRO      mg/dL 15.80   CREATININE, URINE (TOTAL)      mg/dL 44.00   MICROALBUMIN/CREAT      <30.0 mg/g 359.1 (H)       Creatinine:  target value and patient current value reviewed   Component      Latest Ref Rng & Units 5/9/2022   Creatinine      0.51 - 0.95 mg/dL 0.76   Glomerular Filtration Rate      >=60 >90       Patient is due for the following labs:    Anthropometric Measurements:  Discussed importance of weight loss with patient for diabetes management. Patient would like to get down to 120#. Recommended shooting for 160# first as a goal.    Physical Activity - Incorporating Activity into Lifestyle :  Pt has limitations to be physically active due to Spina Bifida and in a wheel chair    Food and Nutrition Related History:   Decreased her diet mountain dew from 12 cans to 3.   She has been not eating \"junk food\".  Meal  Content   1st meal Time: 9 am  Food choice: bowl of cereal with one piece of bread/butter  Beverage: few sips of mountain dew   2nd Meal Time: 1230-1  Food choice: salad or meat and fruit with pb  Beverage: diet mountain dew   3rd  Meal Time: 5-6 pm  Food choice: meat + potato+ vegetable  Beverage: dew   Snacks/Desserts AM: none  PM: none  Evening: none     Oral Fluids Diet mountain dew  ETOH: denies     Nutrition Assessment:   Meal pattern is satisfactory - pt is eating something every 4 to 5 hours awake;   Carbohydrate is fairly well controlled per meal     NUTRITION PRESCRIPTION:  Meal plan with  30 grams of carbohydrates per meal  Low fat/low cholesterol  Plate Method  Limit to 15 grams of carbohydrates for snacks    Reviewed effects of food on blood sugars  Discussed benefits of balancing meals  Reviewed plate method  Reviewed basic carbohydrate counting  Reviewed label reading  Reviewed benefits of higher fiber foods, increasing non-starchy vegetable intake    Other Patient Information:  Sees Dr. Hall for diabetes management. Has Spina Bifida. Colostomy. Lives at [facility]. Has her own room with a fridge. Her step mom and dad go shopping for her. Diagnosed last summer. Mom with type 2 diabetes. Paternal grandfather with diabetes. She is on vanco and cefepime for wounds. Has a picc line.     Dilated eye exam- April 2022  Dental exam-cleaning comes to [facility]. Talked about brushing flossing. Has her own teeth. Community care.   Hypertension-takes daily meds  History of coronary artery disease- denies  History of hyperlipidemia- denies  Nephropathy- normal kidney function  Foot care-   Yearly flu injection- 9/2021  Covid vaccine- 2 doses plus booster  Current symptoms r/t diabetes   Stress level - moderate. History of anxiety and depression. Is on medication.   Smoking history   Apnea     Print/Written Resources Provided:   Best Foods for You: Healthy Food Choices for People with Diabetes from ADA  Low Calorie/Low Sugar Beverages from [facility] Nutrition Services  FYWB Diabetes: Learning About Serving and Portion Sizes  FYWB Food Choices for Diabetes & Weight Management  FYWB How to Read Food Labels  FYWB Healthy  Snacking  FYWB Eating out When You Have Diabetes  FYW4B Dietary Fat: What's Health and What's Not  FYWB Dietary Fiber Content in Food Groups  FYWB Sodium Restricted Diet Guidelines  AVS    Plan:  Chart routed to referring Provider  Goal Setting to Promote Health & Problem Solving for Daily Living was discussed.  See DM Care Plan for goals and topics covered.  See Patient Instructions for further information.     Order:  Order Expires: 3/16/2023  Order located in EMR.    Recommended Follow-up:  Pt to follow up with DM education in a one on one visit.  The patient was encouraged to call back with any questions or concerns.    Assessments :  Verify assessment form is up to date: Current 3/24/22

## 2024-03-27 NOTE — PROGRESS NOTES
I saw and evaluated the patient. I personally obtained the key and critical portions of the history and physical exam or was physically present for key and critical portions performed by the resident/fellow. I reviewed the resident/fellow's documentation and discussed the patient with the resident/fellow. I agree with the resident/fellow's medical decision making as documented in the note.    Kasia Mayfield MD       Initial Clinical Review    Admission: Date/Time/Statement:   Admission Orders (From admission, onward)     Ordered        09/12/21 1228  Inpatient Admission  Once                   Orders Placed This Encounter   Procedures    Inpatient Admission     Standing Status:   Standing     Number of Occurrences:   1     Order Specific Question:   Level of Care     Answer:   Med Surg [16]     Order Specific Question:   Estimated length of stay     Answer:   More than 2 Midnights     Order Specific Question:   Certification     Answer:   I certify that inpatient services are medically necessary for this patient for a duration of greater than two midnights  See H&P and MD Progress Notes for additional information about the patient's course of treatment  ED Arrival Information     Expected Arrival Acuity    - 9/12/2021 09:31 Emergent         Means of arrival Escorted by Service Admission type    Wheelchair Spouse Hospitalist Urgent         Arrival complaint    SOB        Chief Complaint   Patient presents with    Abdominal Pain     pt c/o RUQ abd pain, chills and sweats since thursday, pt also c/p sob  Initial Presentation: 75year old male to the ED from home with complaints of abdominal pain, chills, sweating for 3 days  Admitted to inpatient for community acquired pneumonia, varicose veins RLE  Check Venous duplex  He arrives with fever, hypertensive  Appears ill  Rales bilaterally  Abdominal tenderness RUQ  CT A/P shows: Peripheral consolidation in the right upper lobe with surrounding groundglass  The appearance is typically seen with pulmonary infarction; however, there is no definite pulmonary embolism  Started on IV abx  Date: 9/13   Day 2:    Continues with low grade fever  Continued IV abx  Venous duplex negative for DVT  Bilateral lower extremity edema     ED Triage Vitals   Temperature Pulse Respirations Blood Pressure SpO2   09/12/21 0937 09/12/21 0937 09/12/21 0970 09/12/21 7286 09/12/21 7910 (!) 103 3 °F (39 6 °C) 72 20 (!) 184/89 94 %      Temp Source Heart Rate Source Patient Position - Orthostatic VS BP Location FiO2 (%)   09/12/21 0937 09/12/21 0937 09/12/21 0937 09/12/21 0937 --   Oral Monitor Sitting Left arm       Pain Score       09/12/21 1000       8          Wt Readings from Last 1 Encounters:   09/12/21 (!) 145 kg (320 lb)     Additional Vital Signs:   Time Temp Pulse Resp  BP  MAP (mmHg)  SpO2  Calculated FIO2 (%) - Nasal Cannula  Nasal Cannula O2 Flow Rate (L/min)  O2 Device  O2 Interface Device  Patient Position - Orthostatic VS   09/13/21 1100 99 8 °F (37 7 °C) -- --  --  --  --  --  --  --  --  --   09/13/21 0837 -- -- --  --  --  --  32  3 L/min  Nasal cannula  --  --   09/13/21 07:23:46 98 8 °F (37 1 °C) 61 18  129/59  82  93 %  --  --  --  --  --   09/13/21 03:55:08 -- -- --  128/59  82  --  --  --  --  --  --   09/13/21 0348 -- 63 18  --  --  97 %  --  --  CPAP  --  Lying   09/13/21 0050 -- 58 18  --  --  93 %  --  --  --  Face mask  --   09/12/21 2130 99 9 °F (37 7 °C) 76 18  136/68  --  96 %  --  --  Nasal cannula  --  --   09/12/21 1552 -- 75 19  184/82Abnormal   --  92 %  --  --  Nasal cannula  --  --   09/12/21 1445 -- 73 16  164/78  112  94 %  --  --  --  --  --   09/12/21 1439 -- -- --  164/78  112  --  --  --  --  --  Lying   09/12/21 1400 102 4 °F (39 1 °C)Abnormal  69 35Abnormal   172/82Abnormal   118  94 %  --  --  --  --  --   09/12/21 1257 102 °F (38 9 °C)Abnormal  -- --  --  --  --  --  --  --  --  --   09/12/21 1110 99 9 °F (37 7 °C) 75 20  143/65  93  90 %  --  --  None (Room air)  --  Lying   09/12/21 1030 -- 71 27Abnormal   150/76  105  91 %  --  --  None (Room air)  --  Lying   09/12/21 1017 -- -- --  --  --  --  --  --  None (Room air)  --  --   09/12/21 0937 103 3 °F (39 6 °C)Abnormal  72 20  184/89Abnormal   128  94 %  --  --  None (Room air)  --  Sitting       Pertinent Labs/Diagnostic Test Results:   9/12 EKG: Normal sinus rhythm  Nonspecific T wave abnormality  Prolonged QT  Abnormal ECG  9/12 CT A/P: Peripheral consolidation in the right upper lobe with surrounding groundglass   The appearance is typically seen with pulmonary infarction; however, there is no definite pulmonary embolism, noting that the distal branches of the pulmonary artery are   not well-visualized, and the soft tissue in the right hilar region is favored to be a prominent lymph node and not an intraluminal thrombus   Moreover, given the size of the consolidation, a larger, more conspicuous thrombus would have been expected      Therefore (and given patient's fever) this is favored to represent pneumonia, and a follow-up a chest CT is recommended in 3 months to document resolution and to ensure the absence of an underlying lesion  2   Small right pleural effusion  3   Indeterminant right renal lesion   Nonemergent contrast-enhanced MRI of the abdomen is advised for further evaluation  4   Gallbladder luminal distention without cholelithiasis or findings of acute cholecystitis   Findings are likely reactive  9/13/21 VAS lower limb venous duplex: CONCLUSION:  Impression:  RIGHT LOWER LIMB LIMITED:  Evaluation shows no evidence of thrombus in the common femoral vein  Doppler evaluation shows a normal response to augmentation maneuvers  LEFT LOWER LIMB:  No evidence of acute or chronic deep vein thrombosis  No evidence of superficial thrombophlebitis noted  Doppler evaluation shows a normal response to augmentation maneuvers  Popliteal, posterior tibial and anterior tibial arterial Doppler waveforms are  triphasic    Results from last 7 days   Lab Units 09/12/21  0957   SARS-COV-2  Negative     Results from last 7 days   Lab Units 09/13/21  0609 09/12/21  0957   WBC Thousand/uL 10 08 11 60*   HEMOGLOBIN g/dL 11 7* 12 4   HEMATOCRIT % 33 7* 35 5*   PLATELETS Thousands/uL 175 188   NEUTROS ABS Thousands/µL  --  8 38*         Results from last 7 days   Lab Units 09/13/21  0609 09/12/21  0957   SODIUM mmol/L 136 133*   POTASSIUM mmol/L 3 5 3 5   CHLORIDE mmol/L 101 99*   CO2 mmol/L 24 24   ANION GAP mmol/L 11 10   BUN mg/dL 14 15   CREATININE mg/dL 0 82 0 83   EGFR ml/min/1 73sq m 92 92   CALCIUM mg/dL 8 0* 8 3   MAGNESIUM mg/dL 1 9  --      Results from last 7 days   Lab Units 09/12/21  0957   AST U/L 21   ALT U/L 27   ALK PHOS U/L 62   TOTAL PROTEIN g/dL 7 6   ALBUMIN g/dL 3 4*   TOTAL BILIRUBIN mg/dL 0 85         Results from last 7 days   Lab Units 09/13/21  0609 09/12/21  0957   GLUCOSE RANDOM mg/dL 109 103         Results from last 7 days   Lab Units 09/12/21  0957   TROPONIN I ng/mL <0 02         Results from last 7 days   Lab Units 09/12/21  1229 09/12/21  0957   PROTIME seconds  --  14 4   INR   --  1 10   PTT seconds 37 40*         Results from last 7 days   Lab Units 09/13/21  0609 09/12/21  0957   PROCALCITONIN ng/ml 0 10 0 08     Results from last 7 days   Lab Units 09/12/21  0957   LACTIC ACID mmol/L 0 7       Results from last 7 days   Lab Units 09/12/21  0957   LIPASE u/L 55*       Results from last 7 days   Lab Units 09/12/21  1155   CLARITY UA  Clear   COLOR UA  Yellow   SPEC GRAV UA  <=1 005   PH UA  6 5   GLUCOSE UA mg/dl Negative   KETONES UA mg/dl 15 (1+)*   BLOOD UA  Negative   PROTEIN UA mg/dl Negative   NITRITE UA  Negative   BILIRUBIN UA  Negative   UROBILINOGEN UA E U /dl 1 0   LEUKOCYTES UA  Negative     Results from last 7 days   Lab Units 09/12/21  0957   INFLUENZA A PCR  Negative   INFLUENZA B PCR  Negative   RSV PCR  Negative         Results from last 7 days   Lab Units 09/12/21  0957   BLOOD CULTURE  Received in Microbiology Lab  Culture in Progress  Received in Microbiology Lab  Culture in Progress       ED Treatment:   Medication Administration from 09/12/2021 0931 to 09/12/2021 1521       Date/Time Order Dose Route Action     09/12/2021 1003 sodium chloride 0 9 % bolus 1,000 mL 1,000 mL Intravenous New Bag     09/12/2021 1000 acetaminophen (TYLENOL) tablet 975 mg 975 mg Oral Given     09/12/2021 1155 cefTRIAXone (ROCEPHIN) 2,000 mg in dextrose 5 % 50 mL IVPB 2,000 mg Intravenous New Bag     09/12/2021 1243 heparin (VTE/PE) high 0 mL Intravenous Hold     09/12/2021 1244 heparin (porcine) injection 10,000 Units 10,000 Units Intravenous Given     09/12/2021 1247 heparin (porcine) 25,000 units in 0 45% NaCl 250 mL infusion (premix) 18 Units/kg/hr Intravenous New Bag     09/12/2021 1438 acetaminophen (TYLENOL) tablet 650 mg 650 mg Oral Given     09/12/2021 1428 senna (SENOKOT) tablet 8 6 mg 8 6 mg Oral Given          Admitting Diagnosis: Abdominal pain [R10 9]  Right upper lobe pneumonia [J18 9]  Sepsis ([facility] [address] ) [A41 9]  Age/Sex: 75 y o  male  Admission Orders:  CBC, BMP, mag  Scheduled Medications:  aspirin, 81 mg, Oral, Daily  atorvastatin, 40 mg, Oral, Daily With Dinner  carvedilol, 25 mg, Oral, BID With Meals  cefTRIAXone, 2,000 mg, Intravenous, Q24H  clopidogrel, 75 mg, Oral, Daily  docusate sodium, 100 mg, Oral, BID  FLUoxetine, 40 mg, Oral, Daily  furosemide, 40 mg, Oral, Daily  gabapentin, 400 mg, Oral, HS  heparin (porcine), 5,000 Units, Subcutaneous, Q8H JAK  isosorbide mononitrate, 60 mg, Oral, Daily  losartan, 100 mg, Oral, Daily  ranolazine, 1,000 mg, Oral, Q12H JAK  senna, 1 tablet, Oral, Daily      Continuous IV Infusions:     PRN Meds:  acetaminophen, 650 mg, Oral, Q6H PRN  calcium carbonate, 1,000 mg, Oral, Daily PRN  hydrALAZINE, 5 mg, Intravenous, Q6H PRN  HYDROmorphone, 0 5 mg, Intravenous, Q6H PRN  HYDROmorphone, 1 mg, Intravenous, Q4H PRN  morphine injection, 2 mg, Intravenous, Q4H PRN  ondansetron, 4 mg, Intravenous, Q6H PRN  oxyCODONE-acetaminophen, 1 tablet, Oral, Q4H PRN        Network Utilization Review Department  ATTENTION: Please call with any questions or concerns to [phone] and carefully listen to the prompts so that you are directed to the right person   All voicemails are confidential   Glo Camacho all requests for admission clinical reviews, approved or denied determinations and any other requests to dedicated fax number below belonging to the campus where the patient is receiving treatment   List of dedicated fax numbers for the Facilities:  [address] [place] [address] DENIALS (Administrative/Medical Necessity) [phone]   [address] (Maternity/NICU/Pediatrics) [phone] [address] [place] [address] [id number] [place] [address] [address] [place] [address] [phone]

## 2024-03-27 NOTE — PROGRESS NOTES
Rheumatology Office Visit      Subjective     Patient ID: [id number]     Tiny Malik is a 43 y.o. female who presents for Rheumatoid Arthritis.    Subjective    Tiny Malik is a 42 yo F w/a history of ARAM, obesity, hypothyroidism, anxiety, nephrolithiasis, and diverticulosis presenting as a new patient referred by PCP for suspected rheumatoid arthritis. Saw PCP 01/18/24 at which time she had polyarthralgia relieved by Decadron. Workup notable for RF > 600, ESR 35, negative GERALD, CBC w/microcytic anemia and mild lymphopenia.     Today patient states she's been very fatigued and has had pain in the hands, feet, and knees. Pain started years ago, seemed to worsen with stress or certain foods and would resolve intermittently. Right after [place] symptoms started to worsen again. She has lost ~ 110lbs over the course of about 2 years. Currently on steroids for poison ivy. Steroids help joints nearly 100%. Has had swelling in the hands and feet along with pain. Pain is worse in the mornings, AM stiffness lasting 1-2 hours. Apart from poison ivy no rash, eye pain/redness, blood in the stool, cough, CP.     [facility] triangle of life    PMH: as above  PSH: D&C, hernia repair, endometrial ablation  FMH: no known FMH of autoimmunity  Social: denies tobacco, no EtOH, no illicits    Review of Systems  Constitutional: Denies fever, chills  Eyes: Denies dry eyes, redness of the eyes, pain in the eyes   ENT: Denies dry mouth, sores in the mouth, poor dentition   Cardiovascular: Denies chest pain, lower extremity edema  Respiratory: Denies shortness of breath, cough, hemoptysis  Gastrointestinal: Denies abdominal pain, N/V/D, dysphagia, odynophagia, heartburn   Integumentary: Denies rash, photosensitivity, nail changes, alopecia  Neurological: Denies any numbness, tingling, focal weakness   Hematologic/Lymphatic: Denies bleeding, easy bruising, Raynaud's phenomena   MSK: As per HPI       Objective  Temp: 36.3 °C (97.3  "°F)  Heart Rate: 69  BP: 97/61 (low bp)    Physical Exam  General: Pleasant, cooperative, in NAD   Eyes: Conjunctiva clear, sclera white, anicteric   Lungs: No respiratory distress; lungs CTAB; no wheezes, rales, rhonchi, or stridor   Heart: Normal S1 and S2; regular rate and rhythm; no murmurs, rubs, or gallops  Skin: No rashes, ulcers, tophi, or nodules noted  MSK:   Upper Extremities:   Hands: No erythema, warmth, synovitis, or pain of the DIPs, PIPs, or MCPs; normal ROM  Wrists: No erythema, warmth, synovitis, or pain of the wrists; normal ROM  Elbows: No erythema, warmth, synovitis, or pain; normal ROM   Shoulders: No erythema, warmth, appreciable swelling, or pain; normal ROM   Lower Extremities:   Knees: No erythema, warmth, swelling, or pain; normal ROM   Ankles: No erythema, warmth, synovitis, or pain; normal ROM  Feet: Hallux valgus b/l; MTP squeeze negative    Last BMP:   Lab Results   Component Value Date     01/18/2024    K 4.0 01/18/2024     01/18/2024    CO2 24 01/18/2024    BUN 18 01/18/2024    CREATININE 0.60 01/18/2024    CALCIUM 9.3 01/18/2024     Last CBC: No components found for: \"CBC\"  Last CRP:   CRP (mg/dL)   Date Value   07/12/2023 5.54 (A)     Last ESR:   Sedimentation Rate (mm/h)   Date Value   01/18/2024 35 (H)     Last Hepatic Function Results:   Bilirubin, Total (mg/dL)   Date Value   01/18/2024 0.3     ALT (U/L)   Date Value   01/18/2024 14     AST (U/L)   Date Value   01/18/2024 13     Alkaline Phosphatase (U/L)   Date Value   01/18/2024 59     Albumin (g/dL)   Date Value   01/18/2024 4.3     Total Protein (g/dL)   Date Value   01/18/2024 7.4       Assessment/Plan   Diagnoses and all orders for this visit:  Rheumatoid arthritis involving multiple sites with positive rheumatoid factor (CMS/HCC)  -     Citrulline Antibody, IgG; Future  -     Comprehensive Metabolic Panel; Future  -     CBC and Auto Differential; Future  -     Hepatitis B Surface Antibody; Future  -     " Hepatitis B Surface Antigen; Future  -     Hepatitis C Antibody; Future  -     Hepatitis B Core Antibody, Total; Future  -     T-Spot TB; Future  -     XR hand 3+ views bilateral  -     XR chest 2 views; Future    Tiny Malik is a 44 yo F w/a history of ARAM, obesity, hypothyroidism, anxiety, nephrolithiasis, and diverticulosis presenting as a new patient referred by PCP for suspected rheumatoid arthritis. She is currently on high dose PO prednisone for poison ivy and has no active joint pain or synovitis on exam; however, she has inflammatory arthritis based on history with a very high titer RF so likely seropositive rheumatoid arthritis. Will obtain further workup as above including CCP, hepatitis serologies, hand XR. Discussed natural course of RA including potential for extra-articular manifestations in seropositive disease. Discussed treatment options. Patient is hesitant to start pharmacotherapy and interested in naturopathic remedies. Discussed the lack of FDA regulation and data regarding this. Seropositive disease is more likely to be aggressive so anticipate she will need DMARD. Will continue to discuss when calling with results of blood work and imaging. Plan would be 5-10mg PO prednisone and start methotrexate.    Follow-up 3 mos    Patient seen and discussed with attending Dr. Tran Dalal MD  PGY-V, Rheumatology    Teaching Statement    Patient interviewed and examined with Fellow.  I personally verified the key and critical portions of the history and physical examination and reviewed the documentation. History and physical as recorded above. I agree with the assessment and plan of the fellow.  Reviewed and approved by SCOOBY JUAREZ on 3/27/24 at 3:36 PM.

## 2024-03-28 LAB
CCP IGG SERPL-ACNC: 273 U/ML
HCV AB SER QL: NONREACTIVE

## 2024-03-30 LAB
NIL(NEG) CONTROL SPOT COUNT: NORMAL
PANEL A SPOT COUNT: 0
PANEL B SPOT COUNT: 0
POS CONTROL SPOT COUNT: NORMAL
T-SPOT. TB INTERPRETATION: NEGATIVE

## 2024-04-01 DIAGNOSIS — M05.79 RHEUMATOID ARTHRITIS INVOLVING MULTIPLE SITES WITH POSITIVE RHEUMATOID FACTOR (MULTI): Primary | ICD-10-CM

## 2024-04-01 RX ORDER — PREDNISONE 10 MG/1
10 TABLET ORAL DAILY
Qty: 90 TABLET | Refills: 0 | Status: SHIPPED | OUTPATIENT
Start: 2024-04-01 | End: 2024-06-30

## 2024-04-01 RX ORDER — METHOTREXATE 2.5 MG/1
TABLET ORAL
Qty: 80 TABLET | Refills: 0 | Status: SHIPPED | OUTPATIENT
Start: 2024-04-01 | End: 2024-07-14

## 2024-04-01 RX ORDER — FOLIC ACID 1 MG/1
1 TABLET ORAL DAILY
Qty: 90 TABLET | Refills: 1 | Status: SHIPPED | OUTPATIENT
Start: 2024-04-01 | End: 2024-09-28

## 2024-04-01 NOTE — PROGRESS NOTES
Lab and imaging results reviewed, called patient to discuss. She has high-titer seropositive RA. Discussed treatment options including indication, risks, benefits, and alternatives. Recommend starting methotrexate 10mg weekly x2 weeks followed by 15mg weekly + daily folic acid. She is completing prednisone taper from poison ivy so will also maintain on prednisone 10mg daily until follow-up. Discussed potential side effects and need for lab monitoring. After discussion patient is agreeable with treatment plan. Has follow-up scheduled in ~ 3 mos.

## 2024-04-23 DIAGNOSIS — E66.09 CLASS 2 OBESITY DUE TO EXCESS CALORIES WITHOUT SERIOUS COMORBIDITY WITH BODY MASS INDEX (BMI) OF 35.0 TO 35.9 IN ADULT: Primary | ICD-10-CM

## 2024-04-23 DIAGNOSIS — E66.09 CLASS 2 OBESITY DUE TO EXCESS CALORIES WITHOUT SERIOUS COMORBIDITY WITH BODY MASS INDEX (BMI) OF 35.0 TO 35.9 IN ADULT: ICD-10-CM

## 2024-04-23 RX ORDER — PHENTERMINE HYDROCHLORIDE 37.5 MG/1
TABLET ORAL
Qty: 30 TABLET | Refills: 0 | OUTPATIENT
Start: 2024-04-23

## 2024-04-23 RX ORDER — PHENTERMINE HYDROCHLORIDE 37.5 MG/1
37.5 TABLET ORAL
Qty: 30 TABLET | Refills: 0 | Status: SHIPPED | OUTPATIENT
Start: 2024-04-23 | End: 2024-05-23 | Stop reason: SDUPTHER

## 2024-04-23 NOTE — TELEPHONE ENCOUNTER
This is a duplicate request. Telephone encounter already created for this medication to be filled

## 2024-05-23 DIAGNOSIS — E66.09 CLASS 2 OBESITY DUE TO EXCESS CALORIES WITHOUT SERIOUS COMORBIDITY WITH BODY MASS INDEX (BMI) OF 35.0 TO 35.9 IN ADULT: ICD-10-CM

## 2024-05-23 RX ORDER — PHENTERMINE HYDROCHLORIDE 37.5 MG/1
37.5 TABLET ORAL
Qty: 30 TABLET | Refills: 0 | Status: SHIPPED | OUTPATIENT
Start: 2024-05-23 | End: 2024-06-22

## 2024-06-27 ENCOUNTER — LAB (OUTPATIENT)
Dept: LAB | Facility: LAB | Age: 44
End: 2024-06-27
Payer: COMMERCIAL

## 2024-06-27 ENCOUNTER — OFFICE VISIT (OUTPATIENT)
Dept: RHEUMATOLOGY | Facility: CLINIC | Age: 44
End: 2024-06-27
Payer: COMMERCIAL

## 2024-06-27 VITALS
WEIGHT: 173 LBS | BODY MASS INDEX: 30.65 KG/M2 | DIASTOLIC BLOOD PRESSURE: 66 MMHG | SYSTOLIC BLOOD PRESSURE: 103 MMHG | TEMPERATURE: 98.4 F | HEIGHT: 63 IN | HEART RATE: 76 BPM

## 2024-06-27 DIAGNOSIS — K11.7 XEROSTOMIA: ICD-10-CM

## 2024-06-27 DIAGNOSIS — M05.79 RHEUMATOID ARTHRITIS INVOLVING MULTIPLE SITES WITH POSITIVE RHEUMATOID FACTOR (MULTI): Primary | ICD-10-CM

## 2024-06-27 DIAGNOSIS — E66.09 CLASS 2 OBESITY DUE TO EXCESS CALORIES WITHOUT SERIOUS COMORBIDITY WITH BODY MASS INDEX (BMI) OF 35.0 TO 35.9 IN ADULT: ICD-10-CM

## 2024-06-27 DIAGNOSIS — M05.79 RHEUMATOID ARTHRITIS INVOLVING MULTIPLE SITES WITH POSITIVE RHEUMATOID FACTOR (MULTI): ICD-10-CM

## 2024-06-27 PROBLEM — E66.812 CLASS 2 OBESITY DUE TO EXCESS CALORIES WITHOUT SERIOUS COMORBIDITY WITH BODY MASS INDEX (BMI) OF 35.0 TO 35.9 IN ADULT: Status: ACTIVE | Noted: 2024-06-27

## 2024-06-27 PROCEDURE — 36415 COLL VENOUS BLD VENIPUNCTURE: CPT

## 2024-06-27 PROCEDURE — 3008F BODY MASS INDEX DOCD: CPT | Performed by: INTERNAL MEDICINE

## 2024-06-27 PROCEDURE — 85025 COMPLETE CBC W/AUTO DIFF WBC: CPT

## 2024-06-27 PROCEDURE — 80053 COMPREHEN METABOLIC PANEL: CPT

## 2024-06-27 PROCEDURE — 86140 C-REACTIVE PROTEIN: CPT

## 2024-06-27 PROCEDURE — 1036F TOBACCO NON-USER: CPT | Performed by: INTERNAL MEDICINE

## 2024-06-27 PROCEDURE — 99214 OFFICE O/P EST MOD 30 MIN: CPT | Performed by: INTERNAL MEDICINE

## 2024-06-27 PROCEDURE — 85652 RBC SED RATE AUTOMATED: CPT

## 2024-06-27 PROCEDURE — 99214 OFFICE O/P EST MOD 30 MIN: CPT | Mod: GC | Performed by: INTERNAL MEDICINE

## 2024-06-27 RX ORDER — PREDNISONE 5 MG/1
TABLET ORAL DAILY
Qty: 42 TABLET | Refills: 0 | Status: SHIPPED | OUTPATIENT
Start: 2024-06-27 | End: 2024-08-08

## 2024-06-27 RX ORDER — PHENTERMINE HYDROCHLORIDE 37.5 MG/1
37.5 TABLET ORAL
Qty: 30 TABLET | Refills: 0 | Status: SHIPPED | OUTPATIENT
Start: 2024-06-27 | End: 2024-07-27

## 2024-06-27 NOTE — PATIENT INSTRUCTIONS
Please do blood tests totday    Prednisone 7.5mg x 2 weeks then 5 mg x 2 weeks then 2.5 mg x 2 weeks then stop    Continue methotrexate same dose and folic acid daily    If there is a flare, please reach out to us [phone]    Follow up x 3 months with Dr Barbara Estrada

## 2024-06-27 NOTE — PROGRESS NOTES
Rheumatology Office Visit      Subjective     Patient ID: [id number]     Tiny Malik is a 43 y.o. female who presents for Follow-up.      RECALL  Seen by Dr Dalal in March 2024    Tiny Malik is a 44 yo F w/a history of ARAM, obesity, hypothyroidism, anxiety, nephrolithiasis, and diverticulosis presenting as a new patient referred by PCP for suspected rheumatoid arthritis. Saw PCP 01/18/24 at which time she had polyarthralgia relieved by Decadron. Workup notable for RF > 600, ESR 35, negative GERALD, CBC w/microcytic anemia and mild lymphopenia.     Today patient states she's been very fatigued and has had pain in the hands, feet, and knees. Pain started years ago, seemed to worsen with stress or certain foods and would resolve intermittently. Right after [place] symptoms started to worsen again. She has lost ~ 110lbs over the course of about 2 years. Currently on steroids for poison ivy. Steroids help joints nearly 100%. Has had swelling in the hands and feet along with pain. Pain is worse in the mornings, AM stiffness lasting 1-2 hours. Apart from poison ivy no rash, eye pain/redness, blood in the stool, cough, CP.     Current IST  Methotrexate 6 tabs weekly with daily folate - started with 4 tabs from 3/2024  Prednisone 10 mg daily 3/2024    Interval History  - no MST, no joint pains  - Tolerating Methotrexate 6 tablets, takes folate daily (  on 6 tabs for about a month and a half)  - still on prednisone 10 mg daily  - moderate xerostomia symptoms, has dental caries; dyrness is more pronounced at night and early morning  - Denies snoring or mouth breathing.  No xeropthalmia. Glandular swelling    Review of Systems  Constitutional: Denies fever, chills  Eyes: Denies dry eyes, redness of the eyes, pain in the eyes   ENT: Denies dry mouth, sores in the mouth, poor dentition   Cardiovascular: Denies chest pain, lower extremity edema  Respiratory: Denies shortness of breath, cough, hemoptysis  Gastrointestinal:  "Denies abdominal pain, N/V/D, dysphagia, odynophagia, heartburn   Integumentary: Denies rash, photosensitivity, nail changes, alopecia  Neurological: Denies any numbness, tingling, focal weakness   Hematologic/Lymphatic: Denies bleeding, easy bruising, Raynaud's phenomena   MSK: As per HPI       Objective  Temp: 36.9 °C (98.4 °F)  Heart Rate: 76  BP: 103/66    Physical Exam  Minimal salivary pooling, dental caries    General: Pleasant, cooperative, in NAD   Eyes: Conjunctiva clear, sclera white, anicteric   Lungs: No respiratory distress; lungs CTAB; no wheezes, rales, rhonchi, or stridor   Heart: Normal S1 and S2; regular rate and rhythm; no murmurs, rubs, or gallops  Skin: No rashes, ulcers, tophi, or nodules noted  MSK:   Upper Extremities:   Hands: No erythema, warmth, synovitis, or pain of the DIPs, PIPs, or MCPs; normal ROM  Wrists: No erythema, warmth, synovitis, or pain of the wrists; normal ROM  Elbows: No erythema, warmth, synovitis, or pain; normal ROM   Shoulders: No erythema, warmth, appreciable swelling, or pain; normal ROM   Lower Extremities:   Knees: No erythema, warmth, swelling, or pain; normal ROM   Ankles: No erythema, warmth, synovitis, or pain; normal ROM  Feet: Hallux valgus b/l; MTP squeeze negative    Last BMP:   Lab Results   Component Value Date     03/27/2024    K 3.9 03/27/2024     03/27/2024    CO2 27 03/27/2024    BUN 25 (H) 03/27/2024    CREATININE 0.77 03/27/2024    CALCIUM 9.4 03/27/2024     Last CBC: No components found for: \"CBC\"  Last CRP:   CRP (mg/dL)   Date Value   07/12/2023 5.54 (A)     Last ESR:   Sedimentation Rate (mm/h)   Date Value   01/18/2024 35 (H)     Last Hepatic Function Results:   Bilirubin, Total (mg/dL)   Date Value   03/27/2024 0.2     ALT (U/L)   Date Value   03/27/2024 17     AST (U/L)   Date Value   03/27/2024 14     Alkaline Phosphatase (U/L)   Date Value   03/27/2024 56     Albumin (g/dL)   Date Value   03/27/2024 3.9     Total Protein (g/dL) "   Date Value   03/27/2024 6.6       Assessment/Plan   Diagnoses and all orders for this visit:  Rheumatoid arthritis involving multiple sites with positive rheumatoid factor (Multi)  CDAI 0 today  In remission  Baseline xrays without any JSN or erosions.    Will start to Taper prednisone by 2.5mg q 2 weeks then stop  Advised to reach out in case of a flare  Continue current dose of methotrexate  Has moderate xerostomia, but no keratoconjunctivitis symptoms  Advised Xylitol lozenges  Dental hygiene and follow up with dentist  Will observe, possibly developing secondary Sjogrens ( SSA/SSB negative)  May need to proceed with parotid ultrasound if symptoms persisted and check c3,c4,SPEP in future  No extra glandular symptoms currently.    -     CBC and Auto Differential; Future  -     Comprehensive Metabolic Panel; Future  -     Sedimentation Rate; Future  -     C-Reactive Protein; Future  -     predniSONE (Deltasone) 5 mg tablet; Take 1.5 tablets (7.5 mg) by mouth once daily for 14 days, THEN 1 tablet (5 mg) once daily for 14 days, THEN 0.5 tablets (2.5 mg) once daily for 14 days.  Xerostomia  As above  Class 2 obesity due to excess calories without serious comorbidity with body mass index (BMI) of 35.0 to 35.9 in adult  Requested a refill of phentermine, being managed by PCP but does not have an appointment and she is out of meds,  30 day supply with no refills and and patient was informed there will be no future refills from rheumatology for this medication    -     phentermine (Adipex-P) 37.5 mg tablet; Take 1 tablet (37.5 mg) by mouth once daily in the morning. Take before meals.      Advised updating vaccination status on her visit with PCP  Malignancy screening reinforced    Follow up x 3 mo with Dr Barbara Estrada  Seen and discussed with Dr Flor Jacobo MD

## 2024-06-28 LAB
ALBUMIN SERPL BCP-MCNC: 4.4 G/DL (ref 3.4–5)
ALP SERPL-CCNC: 56 U/L (ref 33–110)
ALT SERPL W P-5'-P-CCNC: 17 U/L (ref 7–45)
ANION GAP SERPL CALC-SCNC: 13 MMOL/L (ref 10–20)
AST SERPL W P-5'-P-CCNC: 14 U/L (ref 9–39)
BASOPHILS # BLD AUTO: 0.08 X10*3/UL (ref 0–0.1)
BASOPHILS NFR BLD AUTO: 1 %
BILIRUB SERPL-MCNC: 0.2 MG/DL (ref 0–1.2)
BUN SERPL-MCNC: 19 MG/DL (ref 6–23)
CALCIUM SERPL-MCNC: 9.5 MG/DL (ref 8.6–10.6)
CHLORIDE SERPL-SCNC: 103 MMOL/L (ref 98–107)
CO2 SERPL-SCNC: 26 MMOL/L (ref 21–32)
CREAT SERPL-MCNC: 0.76 MG/DL (ref 0.5–1.05)
CRP SERPL-MCNC: 0.1 MG/DL
EGFRCR SERPLBLD CKD-EPI 2021: >90 ML/MIN/1.73M*2
EOSINOPHIL # BLD AUTO: 0.19 X10*3/UL (ref 0–0.7)
EOSINOPHIL NFR BLD AUTO: 2.3 %
ERYTHROCYTE [DISTWIDTH] IN BLOOD BY AUTOMATED COUNT: 15.7 % (ref 11.5–14.5)
ERYTHROCYTE [SEDIMENTATION RATE] IN BLOOD BY WESTERGREN METHOD: 1 MM/H (ref 0–20)
GLUCOSE SERPL-MCNC: 99 MG/DL (ref 74–99)
HCT VFR BLD AUTO: 37.9 % (ref 36–46)
HGB BLD-MCNC: 11.9 G/DL (ref 12–16)
IMM GRANULOCYTES # BLD AUTO: 0.02 X10*3/UL (ref 0–0.7)
IMM GRANULOCYTES NFR BLD AUTO: 0.2 % (ref 0–0.9)
LYMPHOCYTES # BLD AUTO: 1.81 X10*3/UL (ref 1.2–4.8)
LYMPHOCYTES NFR BLD AUTO: 21.7 %
MCH RBC QN AUTO: 29.2 PG (ref 26–34)
MCHC RBC AUTO-ENTMCNC: 31.4 G/DL (ref 32–36)
MCV RBC AUTO: 93 FL (ref 80–100)
MONOCYTES # BLD AUTO: 0.59 X10*3/UL (ref 0.1–1)
MONOCYTES NFR BLD AUTO: 7.1 %
NEUTROPHILS # BLD AUTO: 5.65 X10*3/UL (ref 1.2–7.7)
NEUTROPHILS NFR BLD AUTO: 67.7 %
NRBC BLD-RTO: 0 /100 WBCS (ref 0–0)
PLATELET # BLD AUTO: 289 X10*3/UL (ref 150–450)
POTASSIUM SERPL-SCNC: 4.2 MMOL/L (ref 3.5–5.3)
PROT SERPL-MCNC: 6.8 G/DL (ref 6.4–8.2)
RBC # BLD AUTO: 4.08 X10*6/UL (ref 4–5.2)
SODIUM SERPL-SCNC: 138 MMOL/L (ref 136–145)
WBC # BLD AUTO: 8.3 X10*3/UL (ref 4.4–11.3)

## 2024-07-08 DIAGNOSIS — M05.79 RHEUMATOID ARTHRITIS INVOLVING MULTIPLE SITES WITH POSITIVE RHEUMATOID FACTOR (MULTI): ICD-10-CM

## 2024-07-08 RX ORDER — METHOTREXATE 2.5 MG/1
15 TABLET ORAL
Qty: 80 TABLET | Refills: 0 | Status: SHIPPED | OUTPATIENT
Start: 2024-07-14

## 2024-08-05 PROBLEM — N12 PYELONEPHRITIS: Status: ACTIVE | Noted: 2024-08-05

## 2024-08-05 PROBLEM — E66.9 OBESITY WITH BODY MASS INDEX 30 OR GREATER: Status: ACTIVE | Noted: 2024-08-05

## 2024-08-05 PROBLEM — E87.6 HYPOKALEMIA: Status: ACTIVE | Noted: 2024-08-05

## 2024-08-08 ENCOUNTER — APPOINTMENT (OUTPATIENT)
Dept: PRIMARY CARE | Facility: CLINIC | Age: 44
End: 2024-08-08
Payer: COMMERCIAL

## 2024-08-08 VITALS
BODY MASS INDEX: 30.79 KG/M2 | OXYGEN SATURATION: 99 % | WEIGHT: 173.8 LBS | HEIGHT: 63 IN | TEMPERATURE: 98.2 F | DIASTOLIC BLOOD PRESSURE: 60 MMHG | HEART RATE: 75 BPM | SYSTOLIC BLOOD PRESSURE: 100 MMHG

## 2024-08-08 DIAGNOSIS — I83.90 SUPERFICIAL VARICOSITIES: ICD-10-CM

## 2024-08-08 DIAGNOSIS — M05.79 RHEUMATOID ARTHRITIS INVOLVING MULTIPLE SITES WITH POSITIVE RHEUMATOID FACTOR (MULTI): ICD-10-CM

## 2024-08-08 DIAGNOSIS — E03.9 ACQUIRED HYPOTHYROIDISM: ICD-10-CM

## 2024-08-08 DIAGNOSIS — D50.8 OTHER IRON DEFICIENCY ANEMIA: Primary | ICD-10-CM

## 2024-08-08 DIAGNOSIS — D64.9 ANEMIA, UNSPECIFIED TYPE: ICD-10-CM

## 2024-08-08 DIAGNOSIS — E66.09 CLASS 2 OBESITY DUE TO EXCESS CALORIES WITHOUT SERIOUS COMORBIDITY WITH BODY MASS INDEX (BMI) OF 35.0 TO 35.9 IN ADULT: ICD-10-CM

## 2024-08-08 PROCEDURE — 99214 OFFICE O/P EST MOD 30 MIN: CPT | Performed by: FAMILY MEDICINE

## 2024-08-08 PROCEDURE — 3008F BODY MASS INDEX DOCD: CPT | Performed by: FAMILY MEDICINE

## 2024-08-08 PROCEDURE — 1036F TOBACCO NON-USER: CPT | Performed by: FAMILY MEDICINE

## 2024-08-08 RX ORDER — PHENTERMINE HYDROCHLORIDE 37.5 MG/1
37.5 TABLET ORAL
Qty: 30 TABLET | Refills: 0 | Status: SHIPPED | OUTPATIENT
Start: 2024-08-08 | End: 2024-09-07

## 2024-08-08 ASSESSMENT — ENCOUNTER SYMPTOMS
FEVER: 0
CHILLS: 0
HEADACHES: 0
COUGH: 0
ABDOMINAL PAIN: 0
SHORTNESS OF BREATH: 0
VOMITING: 0
JOINT SWELLING: 1
DIARRHEA: 0
FATIGUE: 0
NAUSEA: 0
SORE THROAT: 0
DYSURIA: 0
ARTHRALGIAS: 1
CONSTIPATION: 0

## 2024-08-08 ASSESSMENT — LIFESTYLE VARIABLES: HOW OFTEN DO YOU HAVE A DRINK CONTAINING ALCOHOL: NEVER

## 2024-08-08 ASSESSMENT — PAIN SCALES - GENERAL: PAINLEVEL: 0-NO PAIN

## 2024-08-08 NOTE — PROGRESS NOTES
"Subjective   Patient ID: Tiny Malik is a 43 y.o. female who presents for Follow-up (Medication refills. Offered screenings,Mammogram and Cologuard/Colonoscopy. She declines today,however, agrees to consider in the future.).    HPI finishing steroid taper and on methotrexate now. She feels pain is worse as taper goes on. Son getting  in 2 weeks.     Review of Systems   Constitutional:  Negative for chills, fatigue and fever.   HENT:  Negative for congestion and sore throat.    Eyes:  Negative for visual disturbance.   Respiratory:  Negative for cough and shortness of breath.    Cardiovascular:  Positive for leg swelling. Negative for chest pain.   Gastrointestinal:  Negative for abdominal pain, constipation, diarrhea, nausea and vomiting.   Genitourinary:  Negative for dysuria.   Musculoskeletal:  Positive for arthralgias and joint swelling.   Skin:  Negative for rash.   Neurological:  Negative for headaches.       Objective   /60   Pulse 75   Temp 36.8 °C (98.2 °F)   Ht 1.6 m (5' 3\")   Wt 78.8 kg (173 lb 12.8 oz)   SpO2 99%   BMI 30.79 kg/m²     Physical Exam  Vitals reviewed.   Constitutional:       General: She is not in acute distress.     Appearance: Normal appearance.   HENT:      Head: Normocephalic.      Right Ear: Tympanic membrane, ear canal and external ear normal.      Left Ear: Tympanic membrane, ear canal and external ear normal.      Nose: Nose normal.      Mouth/Throat:      Mouth: Mucous membranes are moist.      Pharynx: Oropharynx is clear.   Eyes:      Extraocular Movements: Extraocular movements intact.      Conjunctiva/sclera: Conjunctivae normal.      Pupils: Pupils are equal, round, and reactive to light.   Cardiovascular:      Rate and Rhythm: Normal rate and regular rhythm.      Pulses: Normal pulses.      Heart sounds: No murmur heard.  Pulmonary:      Effort: Pulmonary effort is normal.      Breath sounds: Normal breath sounds. No wheezing or rhonchi. "   Abdominal:      General: Bowel sounds are normal. There is no distension.      Palpations: Abdomen is soft.      Tenderness: There is no abdominal tenderness. There is no rebound.   Musculoskeletal:         General: Swelling present.      Right lower leg: No edema.      Left lower leg: Edema present.      Comments: Significant varicosities   Skin:     General: Skin is warm.      Coloration: Skin is not jaundiced.   Neurological:      General: No focal deficit present.      Mental Status: She is alert and oriented to person, place, and time.      Cranial Nerves: No cranial nerve deficit.      Gait: Gait normal.   Psychiatric:         Mood and Affect: Mood normal.         Assessment/Plan   Problem List Items Addressed This Visit             ICD-10-CM    Acquired hypothyroidism E03.9    Relevant Orders    Thyroid Stimulating Hormone    Thyroxine, Free    Iron deficiency anemia - Primary D50.9    Superficial varicosities I83.90    Rheumatoid arthritis involving multiple sites with positive rheumatoid factor (Multi) M05.79    Relevant Orders    Comprehensive Metabolic Panel    Class 2 obesity due to excess calories without serious comorbidity with body mass index (BMI) of 35.0 to 35.9 in adult E66.09, Z68.35    Relevant Medications    phentermine (Adipex-P) 37.5 mg tablet     Other Visit Diagnoses         Codes    Anemia, unspecified type     D64.9    Relevant Orders    CBC and Auto Differential    Iron and TIBC          On lower dose thyroid, having labs done in october with rheumatology. Stop iron and recheck in October as well. May need different meds for inflammation now that steroids are done. Will start with motrin otc.

## 2024-08-09 DIAGNOSIS — F51.01 PRIMARY INSOMNIA: ICD-10-CM

## 2024-08-09 RX ORDER — TRAZODONE HYDROCHLORIDE 50 MG/1
50 TABLET ORAL NIGHTLY PRN
Qty: 90 TABLET | Refills: 1 | Status: SHIPPED | OUTPATIENT
Start: 2024-08-09 | End: 2025-02-05

## 2024-08-29 ENCOUNTER — APPOINTMENT (OUTPATIENT)
Dept: PRIMARY CARE | Facility: CLINIC | Age: 44
End: 2024-08-29
Payer: COMMERCIAL

## 2024-09-11 DIAGNOSIS — E66.09 CLASS 2 OBESITY DUE TO EXCESS CALORIES WITHOUT SERIOUS COMORBIDITY WITH BODY MASS INDEX (BMI) OF 35.0 TO 35.9 IN ADULT: Primary | ICD-10-CM

## 2024-09-11 RX ORDER — PHENTERMINE HYDROCHLORIDE 37.5 MG/1
37.5 TABLET ORAL
Qty: 30 TABLET | Refills: 0 | Status: SHIPPED | OUTPATIENT
Start: 2024-09-11 | End: 2024-10-11

## 2024-09-11 NOTE — TELEPHONE ENCOUNTER
LOV: 08/08/2024   [facility]     [Normocephalic] : normocephalic [Atraumatic] : atraumatic [EOMI] : extra ocular movement intact [No Supraclavicular Adenopathy] : no supraclavicular adenopathy [No Cervical Adenopathy] : no cervical adenopathy [No dominant masses] : no dominant masses in right breast  [No dominant masses] : no dominant masses left breast [No Nipple Retraction] : no left nipple retraction [No Nipple Discharge] : no left nipple discharge [No Axillary Lymphadenopathy] : no left axillary lymphadenopathy [Soft] : abdomen soft [Not Tender] : non-tender [No Edema] : no edema [No Rashes] : no rashes [No Ulceration] : no ulceration [de-identified] : no suspicious abnormalities palpated within either breast, no lymphadenopathy  [de-identified] : her left sided breast cancer was not readily palpable

## 2024-09-29 NOTE — PROGRESS NOTES
Rheumatology Office Visit    Recall  Tiny Malik is a 42 yo F w/a history of ARAM, obesity, hypothyroidism, anxiety, nephrolithiasis, and diverticulosis referred by PCP for suspected rheumatoid arthritis. Saw PCP 01/18/24 at which time she had polyarthralgia relieved by Decadron.     Previous labs:  RF > 600,   ESR 35  Negative GERALD, CBC w/microcytic anemia and mild lymphopenia.     Subjective     Patient ID: [id number]   Tiny Malik is a 43 y.o. female who presents for follow-up of seropositive RA.    Current IST  Methotrexate 6 tabs weekly with daily folate - started with 4 tabs from 3/2024 + daily FA  Prednisone 10 mg daily 3/2024    Interval History  Last clinic visit 6/2024 with Dr. Jacobo  Off steroids since end of June  Takes Motrin-Tylenol combo pill 2 tabs (125 mg-250 mg) PRN - usually only on days with increased activity, once-twice/week  Usually in the evening has more soreness, new since stopping rpednisone  Not really any AM stiffness  No joint swelling    Still having xerostomia mostly at night   No new dental carries  No dry eyes  Reports some left salivary gland swelling that self-resolved  No xeropthalmia  No chest pain, no SOB  Reports saw eye doctor 1 mo ago, prescribed bifocals, otherwise no concerns    Labs 6/2024  CMP wnl  CBC Hgb 11.9 (previously 9-11.7), increased RDW, normal WBC and plt  ESR normal  CRP normal    Review of Systems  Constitutional: Denies fever, chills  Eyes: Denies dry eyes, redness of the eyes, pain in the eyes   ENT: Denies dry mouth, sores in the mouth, poor dentition   Cardiovascular: Denies chest pain, lower extremity edema  Respiratory: Denies shortness of breath, cough, hemoptysis  Gastrointestinal: Denies abdominal pain, N/V/D, dysphagia, odynophagia, heartburn   Integumentary: Denies rash, photosensitivity, nail changes, alopecia  Neurological: Denies any numbness, tingling, focal weakness   Hematologic/Lymphatic: Denies bleeding, easy bruising,  "Raynaud's phenomena   MSK: As per HPI       Objective  Vitals:    10/01/24 0817   BP: 90/62   Pulse: 63         Physical Exam  Minimal salivary pooling    General: Pleasant, cooperative, in NAD   Eyes: Conjunctiva clear, sclera white, anicteric   Lungs: No respiratory distress; lungs CTAB; no wheezes, rales, rhonchi, or stridor   Heart: Normal S1 and S2; regular rate and rhythm; no murmurs, rubs, or gallops  Skin: No rashes, ulcers, tophi, or nodules noted  MSK:   Upper Extremities:   Hands: No erythema, warmth, synovitis, or pain of the DIPs, PIPs, or MCPs; normal ROM  Wrists: No erythema, warmth, synovitis, or pain of the wrists; normal ROM  Elbows: No erythema, warmth, synovitis, or pain; normal ROM   Shoulders: No erythema, warmth, appreciable swelling, or pain; normal ROM   Lower Extremities:   Knees: No erythema, warmth, swelling, or pain; normal ROM   Ankles: No erythema, warmth, synovitis, or pain; normal ROM  Feet: Hallux valgus b/l; MTP squeeze negative    Last BMP:   Lab Results   Component Value Date     06/27/2024    K 4.2 06/27/2024     06/27/2024    CO2 26 06/27/2024    BUN 19 06/27/2024    CREATININE 0.76 06/27/2024    CALCIUM 9.5 06/27/2024     Last CBC: No components found for: \"CBC\"  Last CRP:   C-Reactive Protein (mg/dL)   Date Value   06/27/2024 0.10     Last ESR:   Sedimentation Rate (mm/h)   Date Value   06/27/2024 1     Last Hepatic Function Results:   Bilirubin, Total (mg/dL)   Date Value   06/27/2024 0.2     ALT (U/L)   Date Value   06/27/2024 17     AST (U/L)   Date Value   06/27/2024 14     Alkaline Phosphatase (U/L)   Date Value   06/27/2024 56     Albumin (g/dL)   Date Value   06/27/2024 4.4     Total Protein (g/dL)   Date Value   06/27/2024 6.8       Assessment/Plan   43 y.o. female who presents for follow-up of seropositive (RF >600, ACPA 237) RA.    Seropositive rheumatoid arthritis, in remission  CDAI 0 today  No synovitis on exam  Baseline xrays without any JSN or " erosions  Advised to reach out in case of a flare  Continue current dose of methotrexate (6 tabs/week)  Update CBC, CMP, inflammatory markers today    Xerostomia, etiology under evaluation  Has moderate xerostomia, but no keratoconjunctivitis symptoms  Possibly developing secondary Sjogrens (SSA/SSB negative)  Advised chewing gum, hard candies, frequent sips of water  Dental hygiene and follow up with dentist  Obtain work-up for secondary Sjogren's syndrome- parotid ultrasound, complements C3 and C4, SPEP      Advised updating vaccination status- COVID, flu, pneumococcal  Malignancy screening reinforced    Patient seen and discussed with Dr. Martínez.    Barbara Estrada MD  Rheumatology Fellow, PGY-4    Orders Placed This Encounter   Procedures    US neck parotid    Sedimentation Rate    C-reactive protein    C3 complement    C4 complement    Serum Protein Electrophoresis

## 2024-10-01 ENCOUNTER — APPOINTMENT (OUTPATIENT)
Dept: RHEUMATOLOGY | Facility: CLINIC | Age: 44
End: 2024-10-01
Payer: COMMERCIAL

## 2024-10-01 ENCOUNTER — LAB (OUTPATIENT)
Dept: LAB | Facility: LAB | Age: 44
End: 2024-10-01
Payer: COMMERCIAL

## 2024-10-01 VITALS
BODY MASS INDEX: 30.79 KG/M2 | SYSTOLIC BLOOD PRESSURE: 90 MMHG | DIASTOLIC BLOOD PRESSURE: 62 MMHG | HEIGHT: 63 IN | HEART RATE: 63 BPM

## 2024-10-01 DIAGNOSIS — E03.9 ACQUIRED HYPOTHYROIDISM: ICD-10-CM

## 2024-10-01 DIAGNOSIS — M35.00 SICCA SYNDROME (MULTI): ICD-10-CM

## 2024-10-01 DIAGNOSIS — D64.9 ANEMIA, UNSPECIFIED TYPE: ICD-10-CM

## 2024-10-01 DIAGNOSIS — M05.79 RHEUMATOID ARTHRITIS INVOLVING MULTIPLE SITES WITH POSITIVE RHEUMATOID FACTOR (MULTI): ICD-10-CM

## 2024-10-01 DIAGNOSIS — K11.7 XEROSTOMIA: ICD-10-CM

## 2024-10-01 DIAGNOSIS — R76.8 CYCLIC CITRULLINATED PEPTIDE (CCP) ANTIBODY POSITIVE: ICD-10-CM

## 2024-10-01 DIAGNOSIS — M05.79 RHEUMATOID ARTHRITIS INVOLVING MULTIPLE SITES WITH POSITIVE RHEUMATOID FACTOR (MULTI): Primary | ICD-10-CM

## 2024-10-01 LAB
ALBUMIN SERPL BCP-MCNC: 4.3 G/DL (ref 3.4–5)
ALP SERPL-CCNC: 49 U/L (ref 33–110)
ALT SERPL W P-5'-P-CCNC: 16 U/L (ref 7–45)
ANION GAP SERPL CALC-SCNC: 12 MMOL/L (ref 10–20)
AST SERPL W P-5'-P-CCNC: 15 U/L (ref 9–39)
BASOPHILS # BLD AUTO: 0.05 X10*3/UL (ref 0–0.1)
BASOPHILS NFR BLD AUTO: 0.7 %
BILIRUB SERPL-MCNC: 0.5 MG/DL (ref 0–1.2)
BUN SERPL-MCNC: 18 MG/DL (ref 6–23)
C3 SERPL-MCNC: 116 MG/DL (ref 87–200)
C4 SERPL-MCNC: 20 MG/DL (ref 10–50)
CALCIUM SERPL-MCNC: 9.7 MG/DL (ref 8.6–10.6)
CHLORIDE SERPL-SCNC: 106 MMOL/L (ref 98–107)
CO2 SERPL-SCNC: 28 MMOL/L (ref 21–32)
CREAT SERPL-MCNC: 0.66 MG/DL (ref 0.5–1.05)
CRP SERPL-MCNC: <0.1 MG/DL
EGFRCR SERPLBLD CKD-EPI 2021: >90 ML/MIN/1.73M*2
EOSINOPHIL # BLD AUTO: 0.19 X10*3/UL (ref 0–0.7)
EOSINOPHIL NFR BLD AUTO: 2.5 %
ERYTHROCYTE [DISTWIDTH] IN BLOOD BY AUTOMATED COUNT: 14.2 % (ref 11.5–14.5)
ERYTHROCYTE [SEDIMENTATION RATE] IN BLOOD BY WESTERGREN METHOD: 3 MM/H (ref 0–20)
GLUCOSE SERPL-MCNC: 87 MG/DL (ref 74–99)
HCT VFR BLD AUTO: 39.9 % (ref 36–46)
HGB BLD-MCNC: 12.4 G/DL (ref 12–16)
IMM GRANULOCYTES # BLD AUTO: 0.02 X10*3/UL (ref 0–0.7)
IMM GRANULOCYTES NFR BLD AUTO: 0.3 % (ref 0–0.9)
IRON SATN MFR SERPL: 26 % (ref 25–45)
IRON SERPL-MCNC: 108 UG/DL (ref 35–150)
LYMPHOCYTES # BLD AUTO: 1.78 X10*3/UL (ref 1.2–4.8)
LYMPHOCYTES NFR BLD AUTO: 23.8 %
MCH RBC QN AUTO: 28.6 PG (ref 26–34)
MCHC RBC AUTO-ENTMCNC: 31.1 G/DL (ref 32–36)
MCV RBC AUTO: 92 FL (ref 80–100)
MONOCYTES # BLD AUTO: 0.71 X10*3/UL (ref 0.1–1)
MONOCYTES NFR BLD AUTO: 9.5 %
NEUTROPHILS # BLD AUTO: 4.73 X10*3/UL (ref 1.2–7.7)
NEUTROPHILS NFR BLD AUTO: 63.2 %
NRBC BLD-RTO: 0 /100 WBCS (ref 0–0)
PLATELET # BLD AUTO: 291 X10*3/UL (ref 150–450)
POTASSIUM SERPL-SCNC: 4.3 MMOL/L (ref 3.5–5.3)
PROT SERPL-MCNC: 7 G/DL (ref 6.4–8.2)
PROT SERPL-MCNC: 7 G/DL (ref 6.4–8.2)
RBC # BLD AUTO: 4.33 X10*6/UL (ref 4–5.2)
SODIUM SERPL-SCNC: 142 MMOL/L (ref 136–145)
T4 FREE SERPL-MCNC: 1.65 NG/DL (ref 0.78–1.48)
TIBC SERPL-MCNC: 416 UG/DL (ref 240–445)
TSH SERPL-ACNC: 0.07 MIU/L (ref 0.44–3.98)
UIBC SERPL-MCNC: 308 UG/DL (ref 110–370)
WBC # BLD AUTO: 7.5 X10*3/UL (ref 4.4–11.3)

## 2024-10-01 PROCEDURE — 99214 OFFICE O/P EST MOD 30 MIN: CPT | Performed by: STUDENT IN AN ORGANIZED HEALTH CARE EDUCATION/TRAINING PROGRAM

## 2024-10-01 PROCEDURE — 86334 IMMUNOFIX E-PHORESIS SERUM: CPT

## 2024-10-01 PROCEDURE — 84165 PROTEIN E-PHORESIS SERUM: CPT | Performed by: INTERNAL MEDICINE

## 2024-10-01 PROCEDURE — 36415 COLL VENOUS BLD VENIPUNCTURE: CPT

## 2024-10-01 PROCEDURE — 1036F TOBACCO NON-USER: CPT | Performed by: STUDENT IN AN ORGANIZED HEALTH CARE EDUCATION/TRAINING PROGRAM

## 2024-10-01 PROCEDURE — 86320 SERUM IMMUNOELECTROPHORESIS: CPT | Performed by: INTERNAL MEDICINE

## 2024-10-01 PROCEDURE — 84165 PROTEIN E-PHORESIS SERUM: CPT

## 2024-10-01 RX ORDER — FOLIC ACID 1 MG/1
1 TABLET ORAL DAILY
Qty: 90 TABLET | Refills: 1 | Status: SHIPPED | OUTPATIENT
Start: 2024-10-01 | End: 2025-03-30

## 2024-10-01 RX ORDER — METHOTREXATE 2.5 MG/1
15 TABLET ORAL
Qty: 80 TABLET | Refills: 1 | Status: SHIPPED | OUTPATIENT
Start: 2024-10-01

## 2024-10-01 NOTE — PATIENT INSTRUCTIONS
Please get updated lab work. Please continue methotrexate 6 tablets/week and daily folic acid (refills have been sent). For your dry mouth, please try chewing gum, sucking on hard candy, and frequent sips of water. Please also get an ultrasound of your parotid (salivary) glands. Please call us if you have any flares at [phone]. Please return to clinic in 3-4 months.

## 2024-10-01 NOTE — PROGRESS NOTES
I saw and evaluated the patient. I personally obtained the key and critical portions of the history and physical exam or was physically present for key and critical portions performed by the resident/fellow. I reviewed the resident/fellow's documentation and discussed the patient with the resident/fellow. I agree with the resident/fellow's medical decision making as documented in the note. except/in addition to:    Seropositive RA, in remission  Continue methotrexate + folic acid   She reported episode of submandibular swelling with mild pain, with self-resolution. Will evaluate for secondary sjogren's.     Kayla Martínez MD  Division of Rheumatology  [facility]

## 2024-10-02 ENCOUNTER — TELEPHONE (OUTPATIENT)
Dept: PRIMARY CARE | Facility: CLINIC | Age: 44
End: 2024-10-02
Payer: COMMERCIAL

## 2024-10-02 NOTE — TELEPHONE ENCOUNTER
Patient called this office she acknowledges current dose and will take the new dose once sent for her appt made for 4 week follow up for blood

## 2024-10-04 ENCOUNTER — TELEPHONE (OUTPATIENT)
Dept: RHEUMATOLOGY | Facility: CLINIC | Age: 44
End: 2024-10-04
Payer: COMMERCIAL

## 2024-10-04 LAB
ALBUMIN: 4.2 G/DL (ref 3.4–5)
ALPHA 1 GLOBULIN: 0.3 G/DL (ref 0.2–0.6)
ALPHA 2 GLOBULIN: 0.5 G/DL (ref 0.4–1.1)
BETA GLOBULIN: 0.9 G/DL (ref 0.5–1.2)
GAMMA GLOBULIN: 1 G/DL (ref 0.5–1.4)
IMMUNOFIXATION COMMENT: NORMAL
PATH REVIEW - SERUM IMMUNOFIXATION: NORMAL
PATH REVIEW-SERUM PROTEIN ELECTROPHORESIS: NORMAL
PROTEIN ELECTROPHORESIS COMMENT: NORMAL

## 2024-10-04 NOTE — TELEPHONE ENCOUNTER
"Tried to call patient at listed number to discuss abnormal SPEP results (ordered as part of secondary Sjogren's workup). Suspected plasma cell dyscrasia from SPEP- \"monoclonal IgG kappa in the gamma region a level too low to quantitate, suggest further evaluation for diagnosis of plasma cell dyscrasia\"    Recommend heme/onc evaluation.    No answer x2. LVM asking to call back.  "

## 2024-10-04 NOTE — TELEPHONE ENCOUNTER
Tried to call patient again around 1600 to discuss above, no answer.    Rajesh, would you be able to reach out to the patient on Monday? We just want to communicate that her SPEP (a blood test we ordered to further evaluate the cause of her dry mouth) was slightly abnormal and hematology evaluation is recommended at this time. This may be a contributing factor to her dry mouth, but more testing is needed (with hematology). Thank you!

## 2024-10-07 NOTE — TELEPHONE ENCOUNTER
Return call placed to patient in an attempt to relay message from provider, Dr. Estrada regarding SPEP. This nurse was unable to establish direct contact and voicemail left requesting call back. This nurse to make aware that her SPEP was slightly abnormal per provider, and hematology evaluation is recommended at this time, and that this may be a contributing factor to her dry mouth, but more testing is needed (with hematology). Thank you!

## 2024-10-08 DIAGNOSIS — E03.9 ACQUIRED HYPOTHYROIDISM: ICD-10-CM

## 2024-10-08 RX ORDER — LEVOTHYROXINE SODIUM 150 UG/1
150 TABLET ORAL DAILY
Qty: 90 TABLET | Refills: 3 | Status: SHIPPED | OUTPATIENT
Start: 2024-10-08 | End: 2025-10-08

## 2024-10-09 ENCOUNTER — TELEPHONE (OUTPATIENT)
Dept: PRIMARY CARE | Facility: CLINIC | Age: 44
End: 2024-10-09
Payer: COMMERCIAL

## 2024-10-09 NOTE — TELEPHONE ENCOUNTER
Patient said she can be reached at [phone]. She missed the previous calls. Please keep calling until she answers. Her phone is away from her home.

## 2024-10-09 NOTE — TELEPHONE ENCOUNTER
Patient received a phone call from Dr Estrada's office regarding her lab results.She does not understand what the office told her and would like Doctor Venessa to look over the lab results and call her regarding the findings.Home number [phone]. Her 's number [phone]. I did advise to call the office back with her questions,but she said she wanted provider to review and explain the results.

## 2024-10-09 NOTE — TELEPHONE ENCOUNTER
Return call received from patient regarding message left to discuss recently resulted labs. This nurse communicated that per provider, Dr. Estrada, the test taken to determine dryness resulted slightly abnormal and that further evaluation from hematology is recommended. Pt inquired on what made test result abnormal and this nurse reiterated that further evaluation would be required to determine from a hemologist. This nurse offered to send code for patient to activate My Chart, but she denied. Nothing further to address at this time.

## 2024-10-14 ENCOUNTER — TELEPHONE (OUTPATIENT)
Dept: PRIMARY CARE | Facility: CLINIC | Age: 44
End: 2024-10-14
Payer: COMMERCIAL

## 2024-10-14 ENCOUNTER — TELEPHONE (OUTPATIENT)
Dept: RHEUMATOLOGY | Facility: CLINIC | Age: 44
End: 2024-10-14
Payer: COMMERCIAL

## 2024-10-14 DIAGNOSIS — R77.8 ABNORMAL SPEP: Primary | ICD-10-CM

## 2024-10-14 DIAGNOSIS — E03.9 ACQUIRED HYPOTHYROIDISM: Primary | ICD-10-CM

## 2024-10-14 RX ORDER — LEVOTHYROXINE SODIUM 100 UG/1
100 TABLET ORAL DAILY
Qty: 30 TABLET | Refills: 1 | Status: SHIPPED | OUTPATIENT
Start: 2024-10-14 | End: 2025-10-14

## 2024-10-14 NOTE — TELEPHONE ENCOUNTER
Pt called said you sent in RX for levothyroxine 150 mcg but it was supposed to be 100mcg.  Please send RX to [facility].

## 2024-10-16 ENCOUNTER — HOSPITAL ENCOUNTER (OUTPATIENT)
Dept: RADIOLOGY | Facility: HOSPITAL | Age: 44
Discharge: HOME | End: 2024-10-16
Payer: COMMERCIAL

## 2024-10-16 DIAGNOSIS — M35.00 SICCA SYNDROME (MULTI): ICD-10-CM

## 2024-10-16 PROCEDURE — 76536 US EXAM OF HEAD AND NECK: CPT | Performed by: RADIOLOGY

## 2024-10-16 PROCEDURE — 76536 US EXAM OF HEAD AND NECK: CPT

## 2024-10-17 DIAGNOSIS — E66.09 CLASS 2 OBESITY DUE TO EXCESS CALORIES WITHOUT SERIOUS COMORBIDITY WITH BODY MASS INDEX (BMI) OF 35.0 TO 35.9 IN ADULT: Primary | ICD-10-CM

## 2024-10-17 DIAGNOSIS — E66.812 CLASS 2 OBESITY DUE TO EXCESS CALORIES WITHOUT SERIOUS COMORBIDITY WITH BODY MASS INDEX (BMI) OF 35.0 TO 35.9 IN ADULT: Primary | ICD-10-CM

## 2024-10-17 RX ORDER — PHENTERMINE HYDROCHLORIDE 37.5 MG/1
37.5 TABLET ORAL
Qty: 30 TABLET | Refills: 0 | Status: SHIPPED | OUTPATIENT
Start: 2024-10-17

## 2024-10-18 ENCOUNTER — TELEPHONE (OUTPATIENT)
Dept: RHEUMATOLOGY | Facility: CLINIC | Age: 44
End: 2024-10-18
Payer: COMMERCIAL

## 2024-10-18 NOTE — TELEPHONE ENCOUNTER
Patient would like a call back from Dr. Martínez with Ultrasound results. Please call daughter's cell phone at [phone]. Please advise.

## 2024-10-18 NOTE — TELEPHONE ENCOUNTER
This patient is following up with Dr. Sean Jimenez could you please update the patient about the ultrasound results.     Thanks

## 2024-10-30 ENCOUNTER — APPOINTMENT (OUTPATIENT)
Dept: PRIMARY CARE | Facility: CLINIC | Age: 44
End: 2024-10-30
Payer: COMMERCIAL

## 2024-10-30 ENCOUNTER — TELEPHONE (OUTPATIENT)
Dept: PRIMARY CARE | Facility: CLINIC | Age: 44
End: 2024-10-30

## 2024-10-30 DIAGNOSIS — E03.9 ACQUIRED HYPOTHYROIDISM: ICD-10-CM

## 2024-10-30 LAB — TSH SERPL-ACNC: 0.83 MIU/L (ref 0.44–3.98)

## 2024-10-30 PROCEDURE — 84443 ASSAY THYROID STIM HORMONE: CPT

## 2024-11-05 ENCOUNTER — OFFICE VISIT (OUTPATIENT)
Dept: HEMATOLOGY/ONCOLOGY | Facility: CLINIC | Age: 44
End: 2024-11-05
Payer: COMMERCIAL

## 2024-11-05 VITALS
TEMPERATURE: 97.5 F | DIASTOLIC BLOOD PRESSURE: 79 MMHG | OXYGEN SATURATION: 98 % | HEART RATE: 67 BPM | HEIGHT: 63 IN | SYSTOLIC BLOOD PRESSURE: 116 MMHG | RESPIRATION RATE: 17 BRPM | WEIGHT: 176.15 LBS | BODY MASS INDEX: 31.21 KG/M2

## 2024-11-05 DIAGNOSIS — R77.8 ABNORMAL SPEP: ICD-10-CM

## 2024-11-05 PROCEDURE — 99214 OFFICE O/P EST MOD 30 MIN: CPT | Performed by: INTERNAL MEDICINE

## 2024-11-05 PROCEDURE — 99204 OFFICE O/P NEW MOD 45 MIN: CPT | Performed by: INTERNAL MEDICINE

## 2024-11-05 PROCEDURE — 3008F BODY MASS INDEX DOCD: CPT | Performed by: INTERNAL MEDICINE

## 2024-11-05 SDOH — ECONOMIC STABILITY: FOOD INSECURITY: WITHIN THE PAST 12 MONTHS, YOU WORRIED THAT YOUR FOOD WOULD RUN OUT BEFORE YOU GOT THE MONEY TO BUY MORE.: NEVER TRUE

## 2024-11-05 SDOH — ECONOMIC STABILITY: FOOD INSECURITY: WITHIN THE PAST 12 MONTHS, THE FOOD YOU BOUGHT JUST DIDN'T LAST AND YOU DIDN'T HAVE MONEY TO GET MORE.: NEVER TRUE

## 2024-11-05 ASSESSMENT — PATIENT HEALTH QUESTIONNAIRE - PHQ9
SUM OF ALL RESPONSES TO PHQ9 QUESTIONS 1 AND 2: 0
2. FEELING DOWN, DEPRESSED OR HOPELESS: NOT AT ALL
1. LITTLE INTEREST OR PLEASURE IN DOING THINGS: NOT AT ALL

## 2024-11-05 ASSESSMENT — COLUMBIA-SUICIDE SEVERITY RATING SCALE - C-SSRS
2. HAVE YOU ACTUALLY HAD ANY THOUGHTS OF KILLING YOURSELF?: NO
6. HAVE YOU EVER DONE ANYTHING, STARTED TO DO ANYTHING, OR PREPARED TO DO ANYTHING TO END YOUR LIFE?: NO
1. IN THE PAST MONTH, HAVE YOU WISHED YOU WERE DEAD OR WISHED YOU COULD GO TO SLEEP AND NOT WAKE UP?: NO

## 2024-11-05 ASSESSMENT — ENCOUNTER SYMPTOMS
DEPRESSION: 0
OCCASIONAL FEELINGS OF UNSTEADINESS: 0
LOSS OF SENSATION IN FEET: 0

## 2024-11-05 ASSESSMENT — PAIN SCALES - GENERAL: PAINLEVEL_OUTOF10: 0-NO PAIN

## 2024-11-05 NOTE — PATIENT INSTRUCTIONS
Today you met with your hematologist/oncologist.  Recent labs were discussed and questions answered.  Scheduling orders were placed.  While we appreciate that you verbalized understanding, if any questions arise after leaving, please do not hesitate to call the office to discuss.  [phone] Antony Oliva  You do not need to follow up with Dr Garcia at this time. Please continue to follow with your primary provider, if they feel you need seen again please call our office for a follow up appointment.

## 2024-11-05 NOTE — PROGRESS NOTES
"Patient ID: Tiny Malik is a 43 y.o. female.  Referring Physician: Kayla Martínez MD  [address]  [address]  [place] [address]  Primary Care Provider: Shanon Burch MD  Visit Type:  Initial Visit     Subjective    HPI  Monoclonal IgG kappa in the gamma region at a level too low to quantitate. Sicca syndrome   Review of Systems   Constitutional: Negative.    HENT:  Negative.     Respiratory: Negative.     Cardiovascular: Negative.       IMPRESSION:  Sonographically unremarkable bilateral parotid glands. No parotid gland hypervascularity, abnormal asymmetric enlargement, or mass.      There were 3 lymph nodes adjacent to the left parotid gland as described. These were sonographically benign in appearance and are likely reactive.      There are 2 adjacent lymph nodes inferior to the right parotid gland. These are indeterminate based on sonography. This could be reactive, infectious/inflammatory, or less likely neoplastic. Clinical correlation needed.         Signed by: Marcin Durán 10/18/2024    Objective   BSA: 1.88 meters squared  /79 (BP Location: Left arm, Patient Position: Sitting, BP Cuff Size: Adult)   Pulse 67   Temp 36.4 °C (97.5 °F) (Temporal)   Resp 17   Ht (S) 1.595 m (5' 2.8\")   Wt 79.9 kg (176 lb 2.4 oz)   SpO2 98%   BMI 31.41 kg/m²      has no past medical history on file.   has a past surgical history that includes Hernia repair.  Family History   Problem Relation Name Age of Onset    Heart attack Mother      Hypothyroidism Mother      Hypertension Father      Diabetes Father       Oncology History    No history exists.       Tiny Malik  reports that she has quit smoking. Her smoking use included cigarettes. She has never used smokeless tobacco.  She  reports that she does not currently use alcohol.  She  reports that she does not currently use drugs.    Physical Exam  Constitutional:       Appearance: Normal appearance.   HENT:      Head: Normocephalic " and atraumatic.   Eyes:      Extraocular Movements: Extraocular movements intact.      Pupils: Pupils are equal, round, and reactive to light.   Abdominal:      General: Abdomen is flat.      Palpations: Abdomen is soft.   Neurological:      Mental Status: She is alert.         WBC   Date/Time Value Ref Range Status   10/01/2024 09:29 AM 7.5 4.4 - 11.3 x10*3/uL Final   06/27/2024 04:17 PM 8.3 4.4 - 11.3 x10*3/uL Final   03/27/2024 03:29 PM 8.6 4.4 - 11.3 x10*3/uL Final     nRBC   Date Value Ref Range Status   10/01/2024 0.0 0.0 - 0.0 /100 WBCs Final   06/27/2024 0.0 0.0 - 0.0 /100 WBCs Final   03/27/2024 0.0 0.0 - 0.0 /100 WBCs Final     RBC   Date Value Ref Range Status   10/01/2024 4.33 4.00 - 5.20 x10*6/uL Final   06/27/2024 4.08 4.00 - 5.20 x10*6/uL Final   03/27/2024 4.41 4.00 - 5.20 x10*6/uL Final     Hemoglobin   Date Value Ref Range Status   10/01/2024 12.4 12.0 - 16.0 g/dL Final   06/27/2024 11.9 (L) 12.0 - 16.0 g/dL Final   03/27/2024 11.7 (L) 12.0 - 16.0 g/dL Final     Hematocrit   Date Value Ref Range Status   10/01/2024 39.9 36.0 - 46.0 % Final   06/27/2024 37.9 36.0 - 46.0 % Final   03/27/2024 37.6 36.0 - 46.0 % Final     MCV   Date/Time Value Ref Range Status   10/01/2024 09:29 AM 92 80 - 100 fL Final   06/27/2024 04:17 PM 93 80 - 100 fL Final   03/27/2024 03:29 PM 85 80 - 100 fL Final     MCH   Date/Time Value Ref Range Status   10/01/2024 09:29 AM 28.6 26.0 - 34.0 pg Final   06/27/2024 04:17 PM 29.2 26.0 - 34.0 pg Final   03/27/2024 03:29 PM 26.5 26.0 - 34.0 pg Final     MCHC   Date/Time Value Ref Range Status   10/01/2024 09:29 AM 31.1 (L) 32.0 - 36.0 g/dL Final   06/27/2024 04:17 PM 31.4 (L) 32.0 - 36.0 g/dL Final   03/27/2024 03:29 PM 31.1 (L) 32.0 - 36.0 g/dL Final     RDW   Date/Time Value Ref Range Status   10/01/2024 09:29 AM 14.2 11.5 - 14.5 % Final   06/27/2024 04:17 PM 15.7 (H) 11.5 - 14.5 % Final   03/27/2024 03:29 PM 16.5 (H) 11.5 - 14.5 % Final     Platelets   Date/Time Value Ref Range  "Status   10/01/2024 09:29  150 - 450 x10*3/uL Final   06/27/2024 04:17  150 - 450 x10*3/uL Final   03/27/2024 03:29  150 - 450 x10*3/uL Final     No results found for: \"MPV\"  Neutrophils %   Date/Time Value Ref Range Status   10/01/2024 09:29 AM 63.2 40.0 - 80.0 % Final   06/27/2024 04:17 PM 67.7 40.0 - 80.0 % Final   03/27/2024 03:29 PM 72.0 40.0 - 80.0 % Final     Immature Granulocytes %, Automated   Date/Time Value Ref Range Status   10/01/2024 09:29 AM 0.3 0.0 - 0.9 % Final     Comment:     Immature Granulocyte Count (IG) includes promyelocytes, myelocytes and metamyelocytes but does not include bands. Percent differential counts (%) should be interpreted in the context of the absolute cell counts (cells/UL).   06/27/2024 04:17 PM 0.2 0.0 - 0.9 % Final     Comment:     Immature Granulocyte Count (IG) includes promyelocytes, myelocytes and metamyelocytes but does not include bands. Percent differential counts (%) should be interpreted in the context of the absolute cell counts (cells/UL).   03/27/2024 03:29 PM 0.3 0.0 - 0.9 % Final     Comment:     Immature Granulocyte Count (IG) includes promyelocytes, myelocytes and metamyelocytes but does not include bands. Percent differential counts (%) should be interpreted in the context of the absolute cell counts (cells/UL).     Lymphocytes %   Date/Time Value Ref Range Status   10/01/2024 09:29 AM 23.8 13.0 - 44.0 % Final   06/27/2024 04:17 PM 21.7 13.0 - 44.0 % Final   03/27/2024 03:29 PM 19.1 13.0 - 44.0 % Final     Monocytes %   Date/Time Value Ref Range Status   10/01/2024 09:29 AM 9.5 2.0 - 10.0 % Final   06/27/2024 04:17 PM 7.1 2.0 - 10.0 % Final   03/27/2024 03:29 PM 6.0 2.0 - 10.0 % Final     Eosinophils %   Date/Time Value Ref Range Status   10/01/2024 09:29 AM 2.5 0.0 - 6.0 % Final   06/27/2024 04:17 PM 2.3 0.0 - 6.0 % Final   03/27/2024 03:29 PM 2.0 0.0 - 6.0 % Final     Basophils %   Date/Time Value Ref Range Status   10/01/2024 09:29 AM " 0.7 0.0 - 2.0 % Final   06/27/2024 04:17 PM 1.0 0.0 - 2.0 % Final   03/27/2024 03:29 PM 0.6 0.0 - 2.0 % Final     Neutrophils Absolute   Date/Time Value Ref Range Status   10/01/2024 09:29 AM 4.73 1.20 - 7.70 x10*3/uL Final     Comment:     Percent differential counts (%) should be interpreted in the context of the absolute cell counts (cells/uL).   06/27/2024 04:17 PM 5.65 1.20 - 7.70 x10*3/uL Final     Comment:     Percent differential counts (%) should be interpreted in the context of the absolute cell counts (cells/uL).   03/27/2024 03:29 PM 6.20 1.20 - 7.70 x10*3/uL Final     Comment:     Percent differential counts (%) should be interpreted in the context of the absolute cell counts (cells/uL).     Immature Granulocytes Absolute, Automated   Date/Time Value Ref Range Status   10/01/2024 09:29 AM 0.02 0.00 - 0.70 x10*3/uL Final   06/27/2024 04:17 PM 0.02 0.00 - 0.70 x10*3/uL Final   03/27/2024 03:29 PM 0.03 0.00 - 0.70 x10*3/uL Final     Lymphocytes Absolute   Date/Time Value Ref Range Status   10/01/2024 09:29 AM 1.78 1.20 - 4.80 x10*3/uL Final   06/27/2024 04:17 PM 1.81 1.20 - 4.80 x10*3/uL Final   03/27/2024 03:29 PM 1.65 1.20 - 4.80 x10*3/uL Final     Monocytes Absolute   Date/Time Value Ref Range Status   10/01/2024 09:29 AM 0.71 0.10 - 1.00 x10*3/uL Final   06/27/2024 04:17 PM 0.59 0.10 - 1.00 x10*3/uL Final   03/27/2024 03:29 PM 0.52 0.10 - 1.00 x10*3/uL Final     Eosinophils Absolute   Date/Time Value Ref Range Status   10/01/2024 09:29 AM 0.19 0.00 - 0.70 x10*3/uL Final   06/27/2024 04:17 PM 0.19 0.00 - 0.70 x10*3/uL Final   03/27/2024 03:29 PM 0.17 0.00 - 0.70 x10*3/uL Final     Basophils Absolute   Date/Time Value Ref Range Status   10/01/2024 09:29 AM 0.05 0.00 - 0.10 x10*3/uL Final   06/27/2024 04:17 PM 0.08 0.00 - 0.10 x10*3/uL Final   03/27/2024 03:29 PM 0.05 0.00 - 0.10 x10*3/uL Final       Assessment/Plan      Monoclonal protein: CBC unremarkable: DC from Heme.     Diagnoses and all orders for  this visit:  Abnormal SPEP  -     Referral to Hematology and Oncology           Sarkis Garcia MD

## 2024-11-06 ASSESSMENT — ENCOUNTER SYMPTOMS
CONSTITUTIONAL NEGATIVE: 1
CARDIOVASCULAR NEGATIVE: 1
RESPIRATORY NEGATIVE: 1

## 2024-12-12 DIAGNOSIS — F41.9 ANXIETY: ICD-10-CM

## 2024-12-12 DIAGNOSIS — E66.812 CLASS 2 OBESITY DUE TO EXCESS CALORIES WITHOUT SERIOUS COMORBIDITY WITH BODY MASS INDEX (BMI) OF 35.0 TO 35.9 IN ADULT: ICD-10-CM

## 2024-12-12 DIAGNOSIS — E03.9 ACQUIRED HYPOTHYROIDISM: Primary | ICD-10-CM

## 2024-12-12 DIAGNOSIS — E88.810 METABOLIC SYNDROME: ICD-10-CM

## 2024-12-12 DIAGNOSIS — E66.09 CLASS 2 OBESITY DUE TO EXCESS CALORIES WITHOUT SERIOUS COMORBIDITY WITH BODY MASS INDEX (BMI) OF 35.0 TO 35.9 IN ADULT: ICD-10-CM

## 2024-12-12 RX ORDER — LEVOTHYROXINE SODIUM 100 UG/1
100 TABLET ORAL DAILY
Qty: 30 TABLET | Refills: 5 | Status: SHIPPED | OUTPATIENT
Start: 2024-12-12 | End: 2025-06-10

## 2024-12-12 RX ORDER — PHENTERMINE HYDROCHLORIDE 37.5 MG/1
37.5 TABLET ORAL
Qty: 30 TABLET | Refills: 0 | Status: SHIPPED | OUTPATIENT
Start: 2024-12-12

## 2024-12-12 RX ORDER — FLUOXETINE HYDROCHLORIDE 20 MG/1
60 CAPSULE ORAL DAILY
Qty: 90 CAPSULE | Refills: 11 | Status: SHIPPED | OUTPATIENT
Start: 2024-12-12 | End: 2025-12-12

## 2024-12-12 RX ORDER — METFORMIN HYDROCHLORIDE 500 MG/1
1000 TABLET ORAL
Qty: 180 TABLET | Refills: 3 | Status: SHIPPED | OUTPATIENT
Start: 2024-12-12

## 2024-12-17 DIAGNOSIS — E66.09 CLASS 2 OBESITY DUE TO EXCESS CALORIES WITHOUT SERIOUS COMORBIDITY WITH BODY MASS INDEX (BMI) OF 35.0 TO 35.9 IN ADULT: ICD-10-CM

## 2024-12-17 DIAGNOSIS — E66.812 CLASS 2 OBESITY DUE TO EXCESS CALORIES WITHOUT SERIOUS COMORBIDITY WITH BODY MASS INDEX (BMI) OF 35.0 TO 35.9 IN ADULT: ICD-10-CM

## 2024-12-17 RX ORDER — PHENTERMINE HYDROCHLORIDE 37.5 MG/1
0.03 TABLET ORAL
Qty: 30 TABLET | Refills: 0 | OUTPATIENT
Start: 2024-12-17

## 2024-12-19 ENCOUNTER — TELEPHONE (OUTPATIENT)
Dept: PRIMARY CARE | Facility: CLINIC | Age: 44
End: 2024-12-19
Payer: COMMERCIAL

## 2024-12-19 DIAGNOSIS — E66.09 CLASS 2 OBESITY DUE TO EXCESS CALORIES WITHOUT SERIOUS COMORBIDITY WITH BODY MASS INDEX (BMI) OF 35.0 TO 35.9 IN ADULT: ICD-10-CM

## 2024-12-19 DIAGNOSIS — E66.812 CLASS 2 OBESITY DUE TO EXCESS CALORIES WITHOUT SERIOUS COMORBIDITY WITH BODY MASS INDEX (BMI) OF 35.0 TO 35.9 IN ADULT: ICD-10-CM

## 2024-12-19 RX ORDER — PHENTERMINE HYDROCHLORIDE 37.5 MG/1
37.5 TABLET ORAL
Qty: 30 TABLET | Refills: 0 | Status: SHIPPED | OUTPATIENT
Start: 2024-12-19

## 2024-12-19 NOTE — TELEPHONE ENCOUNTER
CALL TO PT MADE. I LEFT DETAILED MESSAGE LETTING PT KNOW MEDICATION WAS SENT. NO NEED FOR PT TO CALL OFFICE BACK UNLESS SHE HAD QUESTIONS.

## 2024-12-19 NOTE — TELEPHONE ENCOUNTER
Patient returned call back asking if Phentermine could be sent over before 2 please. This is in order for pharmacy to prepare all her refills at the same time so they can be sent via mail to her at the same time. Pascual please.

## 2024-12-19 NOTE — TELEPHONE ENCOUNTER
Pt called office requesting her phentermine be sent in again. She stated that pharmacy told her it was canceled and to contact our office to send in a new script. [facility] [place].
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
## 2025-01-08 ENCOUNTER — LAB (OUTPATIENT)
Dept: LAB | Facility: LAB | Age: 45
End: 2025-01-08
Payer: COMMERCIAL

## 2025-01-08 ENCOUNTER — APPOINTMENT (OUTPATIENT)
Dept: RHEUMATOLOGY | Facility: CLINIC | Age: 45
End: 2025-01-08
Payer: COMMERCIAL

## 2025-01-08 ENCOUNTER — HOSPITAL ENCOUNTER (OUTPATIENT)
Dept: RADIOLOGY | Facility: CLINIC | Age: 45
Discharge: HOME | End: 2025-01-08
Payer: COMMERCIAL

## 2025-01-08 VITALS
DIASTOLIC BLOOD PRESSURE: 72 MMHG | WEIGHT: 176.8 LBS | TEMPERATURE: 97.9 F | HEART RATE: 71 BPM | OXYGEN SATURATION: 98 % | SYSTOLIC BLOOD PRESSURE: 111 MMHG | BODY MASS INDEX: 31.33 KG/M2 | HEIGHT: 63 IN

## 2025-01-08 DIAGNOSIS — M05.79 RHEUMATOID ARTHRITIS INVOLVING MULTIPLE SITES WITH POSITIVE RHEUMATOID FACTOR (MULTI): ICD-10-CM

## 2025-01-08 DIAGNOSIS — M05.79 RHEUMATOID ARTHRITIS INVOLVING MULTIPLE SITES WITH POSITIVE RHEUMATOID FACTOR (MULTI): Primary | ICD-10-CM

## 2025-01-08 DIAGNOSIS — M54.2 CERVICALGIA: ICD-10-CM

## 2025-01-08 DIAGNOSIS — Z79.69 LONG TERM (CURRENT) USE OF OTHER IMMUNOMODULATORS AND IMMUNOSUPPRESSANTS: ICD-10-CM

## 2025-01-08 DIAGNOSIS — M35.00 SICCA SYNDROME (MULTI): ICD-10-CM

## 2025-01-08 DIAGNOSIS — R76.8 CYCLIC CITRULLINATED PEPTIDE (CCP) ANTIBODY POSITIVE: ICD-10-CM

## 2025-01-08 LAB
ALBUMIN SERPL BCP-MCNC: 4.4 G/DL (ref 3.4–5)
ALP SERPL-CCNC: 51 U/L (ref 33–110)
ALT SERPL W P-5'-P-CCNC: 12 U/L (ref 7–45)
ANION GAP SERPL CALC-SCNC: 12 MMOL/L (ref 10–20)
AST SERPL W P-5'-P-CCNC: 12 U/L (ref 9–39)
BASOPHILS # BLD AUTO: 0.06 X10*3/UL (ref 0–0.1)
BASOPHILS NFR BLD AUTO: 0.7 %
BILIRUB SERPL-MCNC: 0.3 MG/DL (ref 0–1.2)
BUN SERPL-MCNC: 23 MG/DL (ref 6–23)
CALCIUM SERPL-MCNC: 9.2 MG/DL (ref 8.6–10.6)
CHLORIDE SERPL-SCNC: 103 MMOL/L (ref 98–107)
CO2 SERPL-SCNC: 27 MMOL/L (ref 21–32)
CREAT SERPL-MCNC: 1.05 MG/DL (ref 0.5–1.05)
CRP SERPL-MCNC: 0.2 MG/DL
EGFRCR SERPLBLD CKD-EPI 2021: 66 ML/MIN/1.73M*2
EOSINOPHIL # BLD AUTO: 0.13 X10*3/UL (ref 0–0.7)
EOSINOPHIL NFR BLD AUTO: 1.6 %
ERYTHROCYTE [DISTWIDTH] IN BLOOD BY AUTOMATED COUNT: 14.5 % (ref 11.5–14.5)
ERYTHROCYTE [SEDIMENTATION RATE] IN BLOOD BY WESTERGREN METHOD: 4 MM/H (ref 0–20)
GLUCOSE SERPL-MCNC: 90 MG/DL (ref 74–99)
HCT VFR BLD AUTO: 33.9 % (ref 36–46)
HGB BLD-MCNC: 10.8 G/DL (ref 12–16)
IMM GRANULOCYTES # BLD AUTO: 0.02 X10*3/UL (ref 0–0.7)
IMM GRANULOCYTES NFR BLD AUTO: 0.2 % (ref 0–0.9)
LYMPHOCYTES # BLD AUTO: 1.5 X10*3/UL (ref 1.2–4.8)
LYMPHOCYTES NFR BLD AUTO: 18.5 %
MCH RBC QN AUTO: 27.8 PG (ref 26–34)
MCHC RBC AUTO-ENTMCNC: 31.9 G/DL (ref 32–36)
MCV RBC AUTO: 87 FL (ref 80–100)
MONOCYTES # BLD AUTO: 0.7 X10*3/UL (ref 0.1–1)
MONOCYTES NFR BLD AUTO: 8.7 %
NEUTROPHILS # BLD AUTO: 5.68 X10*3/UL (ref 1.2–7.7)
NEUTROPHILS NFR BLD AUTO: 70.3 %
NRBC BLD-RTO: 0 /100 WBCS (ref 0–0)
PLATELET # BLD AUTO: 297 X10*3/UL (ref 150–450)
POTASSIUM SERPL-SCNC: 4.2 MMOL/L (ref 3.5–5.3)
PROT SERPL-MCNC: 6.8 G/DL (ref 6.4–8.2)
RBC # BLD AUTO: 3.89 X10*6/UL (ref 4–5.2)
SODIUM SERPL-SCNC: 138 MMOL/L (ref 136–145)
WBC # BLD AUTO: 8.1 X10*3/UL (ref 4.4–11.3)

## 2025-01-08 PROCEDURE — 86140 C-REACTIVE PROTEIN: CPT

## 2025-01-08 PROCEDURE — 99214 OFFICE O/P EST MOD 30 MIN: CPT | Performed by: STUDENT IN AN ORGANIZED HEALTH CARE EDUCATION/TRAINING PROGRAM

## 2025-01-08 PROCEDURE — 72050 X-RAY EXAM NECK SPINE 4/5VWS: CPT

## 2025-01-08 PROCEDURE — 80053 COMPREHEN METABOLIC PANEL: CPT

## 2025-01-08 PROCEDURE — 85652 RBC SED RATE AUTOMATED: CPT

## 2025-01-08 PROCEDURE — 3008F BODY MASS INDEX DOCD: CPT | Performed by: STUDENT IN AN ORGANIZED HEALTH CARE EDUCATION/TRAINING PROGRAM

## 2025-01-08 PROCEDURE — 85025 COMPLETE CBC W/AUTO DIFF WBC: CPT

## 2025-01-08 PROCEDURE — 1036F TOBACCO NON-USER: CPT | Performed by: STUDENT IN AN ORGANIZED HEALTH CARE EDUCATION/TRAINING PROGRAM

## 2025-01-08 RX ORDER — METHOTREXATE 2.5 MG/1
15 TABLET ORAL
Qty: 80 TABLET | Refills: 1 | Status: SHIPPED | OUTPATIENT
Start: 2025-01-12

## 2025-01-08 RX ORDER — CYCLOBENZAPRINE HCL 10 MG
10 TABLET ORAL NIGHTLY PRN
Qty: 30 TABLET | Refills: 0 | Status: SHIPPED | OUTPATIENT
Start: 2025-01-08

## 2025-01-08 RX ORDER — FOLIC ACID 1 MG/1
1 TABLET ORAL DAILY
Qty: 90 TABLET | Refills: 1 | Status: SHIPPED | OUTPATIENT
Start: 2025-01-08 | End: 2025-07-07

## 2025-01-08 ASSESSMENT — PAIN SCALES - GENERAL: PAINLEVEL_OUTOF10: 0-NO PAIN

## 2025-01-13 ENCOUNTER — TELEPHONE (OUTPATIENT)
Dept: RHEUMATOLOGY | Facility: CLINIC | Age: 45
End: 2025-01-13
Payer: COMMERCIAL

## 2025-01-13 NOTE — TELEPHONE ENCOUNTER
Called patient to discuss lab results and X-ray results. Labs show stable normocytic anemia, otherwise wnl. C-spine X-ray consistent with mild DJD, no signs of atlantoaxial subluxation. Continue same treatment plan as discussed at visit. Patient agrees with plan.

## 2025-01-15 DIAGNOSIS — E66.812 CLASS 2 OBESITY DUE TO EXCESS CALORIES WITHOUT SERIOUS COMORBIDITY WITH BODY MASS INDEX (BMI) OF 35.0 TO 35.9 IN ADULT: ICD-10-CM

## 2025-01-15 DIAGNOSIS — E66.09 CLASS 2 OBESITY DUE TO EXCESS CALORIES WITHOUT SERIOUS COMORBIDITY WITH BODY MASS INDEX (BMI) OF 35.0 TO 35.9 IN ADULT: ICD-10-CM

## 2025-01-15 NOTE — TELEPHONE ENCOUNTER
LOV 12/19/2024    Requested Prescriptions     Pending Prescriptions Disp Refills    phentermine (Adipex-P) 37.5 mg tablet 30 tablet 0     Sig: Take 1 tablet (37.5 mg) by mouth once daily in the morning. Take before meals.

## 2025-01-16 RX ORDER — PHENTERMINE HYDROCHLORIDE 37.5 MG/1
37.5 TABLET ORAL
Qty: 30 TABLET | Refills: 0 | Status: SHIPPED | OUTPATIENT
Start: 2025-01-16

## 2025-02-17 DIAGNOSIS — E66.09 CLASS 2 OBESITY DUE TO EXCESS CALORIES WITHOUT SERIOUS COMORBIDITY WITH BODY MASS INDEX (BMI) OF 35.0 TO 35.9 IN ADULT: ICD-10-CM

## 2025-02-17 DIAGNOSIS — E66.812 CLASS 2 OBESITY DUE TO EXCESS CALORIES WITHOUT SERIOUS COMORBIDITY WITH BODY MASS INDEX (BMI) OF 35.0 TO 35.9 IN ADULT: ICD-10-CM

## 2025-02-17 RX ORDER — PHENTERMINE HYDROCHLORIDE 37.5 MG/1
37.5 TABLET ORAL
Qty: 30 TABLET | Refills: 0 | Status: SHIPPED | OUTPATIENT
Start: 2025-02-17

## 2025-03-21 DIAGNOSIS — E66.812 CLASS 2 OBESITY DUE TO EXCESS CALORIES WITHOUT SERIOUS COMORBIDITY WITH BODY MASS INDEX (BMI) OF 35.0 TO 35.9 IN ADULT: ICD-10-CM

## 2025-03-21 DIAGNOSIS — E66.09 CLASS 2 OBESITY DUE TO EXCESS CALORIES WITHOUT SERIOUS COMORBIDITY WITH BODY MASS INDEX (BMI) OF 35.0 TO 35.9 IN ADULT: ICD-10-CM

## 2025-03-24 RX ORDER — PHENTERMINE HYDROCHLORIDE 37.5 MG/1
37.5 TABLET ORAL
Qty: 30 TABLET | Refills: 0 | Status: SHIPPED | OUTPATIENT
Start: 2025-03-24

## 2025-04-08 DIAGNOSIS — E66.09 CLASS 2 OBESITY DUE TO EXCESS CALORIES WITHOUT SERIOUS COMORBIDITY WITH BODY MASS INDEX (BMI) OF 35.0 TO 35.9 IN ADULT: ICD-10-CM

## 2025-04-08 DIAGNOSIS — E66.812 CLASS 2 OBESITY DUE TO EXCESS CALORIES WITHOUT SERIOUS COMORBIDITY WITH BODY MASS INDEX (BMI) OF 35.0 TO 35.9 IN ADULT: ICD-10-CM

## 2025-04-08 RX ORDER — PHENTERMINE HYDROCHLORIDE 37.5 MG/1
37.5 TABLET ORAL
Qty: 30 TABLET | Refills: 0 | Status: SHIPPED | OUTPATIENT
Start: 2025-04-08

## 2025-04-09 DIAGNOSIS — F51.01 PRIMARY INSOMNIA: ICD-10-CM

## 2025-04-10 RX ORDER — TRAZODONE HYDROCHLORIDE 50 MG/1
TABLET ORAL
Qty: 90 TABLET | Refills: 0 | Status: SHIPPED | OUTPATIENT
Start: 2025-04-10

## 2025-04-16 ENCOUNTER — APPOINTMENT (OUTPATIENT)
Dept: RHEUMATOLOGY | Facility: CLINIC | Age: 45
End: 2025-04-16
Payer: COMMERCIAL

## 2025-04-16 VITALS
SYSTOLIC BLOOD PRESSURE: 105 MMHG | TEMPERATURE: 98 F | OXYGEN SATURATION: 98 % | WEIGHT: 182.2 LBS | DIASTOLIC BLOOD PRESSURE: 68 MMHG | BODY MASS INDEX: 32.28 KG/M2 | HEART RATE: 75 BPM | HEIGHT: 63 IN

## 2025-04-16 DIAGNOSIS — M05.79 RHEUMATOID ARTHRITIS INVOLVING MULTIPLE SITES WITH POSITIVE RHEUMATOID FACTOR (MULTI): Primary | ICD-10-CM

## 2025-04-16 DIAGNOSIS — M35.00 SICCA SYNDROME (MULTI): ICD-10-CM

## 2025-04-16 DIAGNOSIS — Z79.69 LONG TERM (CURRENT) USE OF OTHER IMMUNOMODULATORS AND IMMUNOSUPPRESSANTS: ICD-10-CM

## 2025-04-16 DIAGNOSIS — K11.7 XEROSTOMIA: ICD-10-CM

## 2025-04-16 PROCEDURE — 99214 OFFICE O/P EST MOD 30 MIN: CPT | Performed by: STUDENT IN AN ORGANIZED HEALTH CARE EDUCATION/TRAINING PROGRAM

## 2025-04-16 PROCEDURE — 3008F BODY MASS INDEX DOCD: CPT | Performed by: STUDENT IN AN ORGANIZED HEALTH CARE EDUCATION/TRAINING PROGRAM

## 2025-04-16 PROCEDURE — 1036F TOBACCO NON-USER: CPT | Performed by: STUDENT IN AN ORGANIZED HEALTH CARE EDUCATION/TRAINING PROGRAM

## 2025-04-16 RX ORDER — PREDNISOLONE ACETATE 10 MG/ML
SUSPENSION/ DROPS OPHTHALMIC
COMMUNITY
Start: 2025-04-09

## 2025-04-16 RX ORDER — METHOTREXATE 2.5 MG/1
15 TABLET ORAL
Qty: 80 TABLET | Refills: 1 | Status: SHIPPED | OUTPATIENT
Start: 2025-04-20

## 2025-04-16 RX ORDER — FOLIC ACID 1 MG/1
1 TABLET ORAL DAILY
Qty: 90 TABLET | Refills: 1 | Status: SHIPPED | OUTPATIENT
Start: 2025-04-16 | End: 2025-10-13

## 2025-04-16 ASSESSMENT — PAIN SCALES - GENERAL: PAINLEVEL_OUTOF10: 0-NO PAIN

## 2025-04-16 NOTE — PROGRESS NOTES
Rheumatology Office Visit    Recall  Tiny Malik is a 43 yo F w/a history of ARAM, obesity, hypothyroidism, anxiety, nephrolithiasis, and diverticulosis referred by PCP for suspected rheumatoid arthritis. Saw PCP 01/18/24 at which time she had polyarthralgia relieved by Decadron.     Previous labs:  RF > 600,   ESR 35  Negative GERALD, CBC w/microcytic anemia and mild lymphopenia.     Saw heme/onc 11/2024 for monoclonal IgG kappa in the gamma region a level too low to quantitate, suggest further evaluation for diagnosis of plasma cell dyscrasia seen on SPEP incidentally  Per heme/onc, monoclonal IgG kappa in the gamma region at a level too low to quantitate, CBC unremarkable: DC from Heme.    Subjective     Patient ID: [id number]   Tiny Malik is a 44 y.o. female who presents for follow-up of seropositive RA.    Current IST  Methotrexate 6 tabs weekly with daily folate - started with 4 tabs from 3/2024 + daily FA    Previous IST  Prednisone 10 mg daily - 3/2024-6/2024    Interval History  Last seen 1/2025  Taking methotrexate 6 tabs weekly with daily FA, tolerating well  Off steroids since end of June 2024  Feels stable, no flares  Takes Motrin-Tylenol combo pill 2 tabs (125 mg-250 mg) PRN - usually only on days with increased activity, once-twice/week  Not really any AM stiffness  No joint swelling    Still having xerostomia mostly at night   No new dental carries  No dry eyes  No chest pain, no SOB    Review of Systems  As per HPI    Objective  Vitals:    04/16/25 1534   BP: 105/68   Pulse: 75   Temp: 36.7 °C (98 °F)   SpO2: 98%         Physical Exam  Decreased salivary pooling    General: Pleasant, cooperative, in NAD   Eyes: Conjunctiva clear, sclera white, anicteric   Lungs: No respiratory distress; lungs CTAB; no wheezes, rales, rhonchi, or stridor   Heart: Normal S1 and S2; regular rate and rhythm; no murmurs, rubs, or gallops  Skin: No rashes, ulcers, tophi, or nodules noted  MSK:   Upper  "Extremities:   Hands: No erythema, warmth, synovitis, or pain of the DIPs, PIPs, or MCPs; normal ROM  Wrists: No erythema, warmth, synovitis, or pain of the wrists; normal ROM  Elbows: No erythema, warmth, synovitis, or pain; normal ROM   Shoulders: No erythema, warmth, appreciable swelling, or pain; normal ROM   Lower Extremities:   Knees: No erythema, warmth, swelling, or pain; normal ROM   Ankles: No erythema, warmth, synovitis, or pain; normal ROM    Last BMP:   Lab Results   Component Value Date     01/08/2025    K 4.2 01/08/2025     01/08/2025    CO2 27 01/08/2025    BUN 23 01/08/2025    CREATININE 1.05 01/08/2025    CALCIUM 9.2 01/08/2025     Last CBC: No components found for: \"CBC\"  Last CRP:   C-Reactive Protein (mg/dL)   Date Value   01/08/2025 0.20     Last ESR:   Sedimentation Rate (mm/h)   Date Value   01/08/2025 4     Last Hepatic Function Results:   Bilirubin, Total (mg/dL)   Date Value   01/08/2025 0.3     ALT (U/L)   Date Value   01/08/2025 12     AST (U/L)   Date Value   01/08/2025 12     Alkaline Phosphatase (U/L)   Date Value   01/08/2025 51     Albumin (g/dL)   Date Value   01/08/2025 4.4     Total Protein (g/dL)   Date Value   01/08/2025 6.8       Assessment/Plan   43 y.o. female who presents for follow-up of seropositive (RF >600, ACPA 237) RA.    Seropositive rheumatoid arthritis, in remission  Strongly seropositive- RF > 600, ACPA 237   CDAI 0 today and last time  No synovitis on exam  Baseline xrays without any JSN or erosions  Advised to reach out in case of a flare  Continue current dose of methotrexate (6 tabs/week), daily FA  10/2024 surveillance labs reviewed and stable  Update CBC, CMP, inflammatory markers today    Xerostomia, stable  Has moderate xerostomia, but no keratoconjunctivitis symptoms  Work-up for secondary Sjogrens (SSA/SSB negative) showed SPEP with monoclonal IgG kappa in the gamma region a level too low to quantitate, suggest further evaluation for " diagnosis of plasma cell dyscrasia (but per heme onc, no further workup given normal CBC), normal complement C3 + C4, parotid ultrasound with some lymphadenopathy but not suggestive of Sjogren's  Advised chewing gum, hard candies, frequent sips of water, XyliMelts  Discussed starting pilocarpine, patient prefers to try OTC treatments first but will notify us if she would like to start pilocarpine. Medication information sheet provided.  Recommended dental hygiene and follow up with dentist    Cervicalgia, improving  1/2025 C-spine XR: mild DJD, no signs of atlantoaxial subluxation  Likely multifactorial - post-traumatic and degenerative  Taking Flexeril 10 mg PRN at bedtime- rarely  Can continue OTC lidocaine patches    HM  Advised updating vaccination status- COVID, flu, pneumococcal  Malignancy screening reinforced    Patient seen and discussed with Dr. Mayfield.    Barbara Estrada MD  Rheumatology Fellow, PGY-4    Orders Placed This Encounter   Procedures    CBC and Auto Differential    Comprehensive metabolic panel    Sedimentation Rate    C-Reactive Protein        Teaching Statement    Patient interviewed and examined with Fellow.  I personally verified the key and critical portions of the history and physical examination and reviewed the documentation. History and physical as recorded above. I agree with the assessment and plan of the fellow.  Reviewed and approved by BARBARA ESTRADA on 4/16/25 at 4:46 PM.

## 2025-04-17 ENCOUNTER — TELEPHONE (OUTPATIENT)
Dept: RHEUMATOLOGY | Facility: CLINIC | Age: 45
End: 2025-04-17
Payer: COMMERCIAL

## 2025-04-17 DIAGNOSIS — D64.9 ANEMIA, UNSPECIFIED TYPE: Primary | ICD-10-CM

## 2025-04-17 LAB
ALBUMIN SERPL-MCNC: 4.1 G/DL (ref 3.6–5.1)
ALP SERPL-CCNC: 58 U/L (ref 31–125)
ALT SERPL-CCNC: 14 U/L (ref 6–29)
ANION GAP SERPL CALCULATED.4IONS-SCNC: 8 MMOL/L (CALC) (ref 7–17)
AST SERPL-CCNC: 15 U/L (ref 10–35)
BASOPHILS # BLD AUTO: 37 CELLS/UL (ref 0–200)
BASOPHILS NFR BLD AUTO: 0.6 %
BILIRUB SERPL-MCNC: 0.2 MG/DL (ref 0.2–1.2)
BUN SERPL-MCNC: 17 MG/DL (ref 7–25)
CALCIUM SERPL-MCNC: 8.8 MG/DL (ref 8.6–10.2)
CHLORIDE SERPL-SCNC: 105 MMOL/L (ref 98–110)
CO2 SERPL-SCNC: 26 MMOL/L (ref 20–32)
CREAT SERPL-MCNC: 0.62 MG/DL (ref 0.5–0.99)
CRP SERPL-MCNC: <3 MG/L
EGFRCR SERPLBLD CKD-EPI 2021: 111 ML/MIN/1.73M2
EOSINOPHIL # BLD AUTO: 143 CELLS/UL (ref 15–500)
EOSINOPHIL NFR BLD AUTO: 2.3 %
ERYTHROCYTE [DISTWIDTH] IN BLOOD BY AUTOMATED COUNT: 15.4 % (ref 11–15)
ERYTHROCYTE [SEDIMENTATION RATE] IN BLOOD BY WESTERGREN METHOD: 2 MM/H
GLUCOSE SERPL-MCNC: 102 MG/DL (ref 65–139)
HCT VFR BLD AUTO: 31.2 % (ref 35–45)
HGB BLD-MCNC: 9.6 G/DL (ref 11.7–15.5)
LYMPHOCYTES # BLD AUTO: 1407 CELLS/UL (ref 850–3900)
LYMPHOCYTES NFR BLD AUTO: 22.7 %
MCH RBC QN AUTO: 25.8 PG (ref 27–33)
MCHC RBC AUTO-ENTMCNC: 30.8 G/DL (ref 32–36)
MCV RBC AUTO: 83.9 FL (ref 80–100)
MONOCYTES # BLD AUTO: 490 CELLS/UL (ref 200–950)
MONOCYTES NFR BLD AUTO: 7.9 %
NEUTROPHILS # BLD AUTO: 4123 CELLS/UL (ref 1500–7800)
NEUTROPHILS NFR BLD AUTO: 66.5 %
PLATELET # BLD AUTO: 294 THOUSAND/UL (ref 140–400)
PMV BLD REES-ECKER: 11 FL (ref 7.5–12.5)
POTASSIUM SERPL-SCNC: 4.2 MMOL/L (ref 3.5–5.3)
PROT SERPL-MCNC: 6.5 G/DL (ref 6.1–8.1)
RBC # BLD AUTO: 3.72 MILLION/UL (ref 3.8–5.1)
SODIUM SERPL-SCNC: 139 MMOL/L (ref 135–146)
WBC # BLD AUTO: 6.2 THOUSAND/UL (ref 3.8–10.8)

## 2025-04-17 NOTE — TELEPHONE ENCOUNTER
Attempted to call patient to discuss lab results (worsening normocytic anemia with elevated RDW)- needs further work-up including iron studies, folic acid, B12, and PCP follow-up.  Otherwise labs stable. Was unable to get in touch with patient but reached patient's daughter- left office number to call patient back at her convenience, will also attempt to call again after 3 pm as per discussion with patient's daughter.

## 2025-04-17 NOTE — PROGRESS NOTES
I saw and evaluated the patient. I personally obtained the key and critical portions of the history and physical exam or was physically present for key and critical portions performed by the resident/fellow. I reviewed the resident/fellow's documentation and discussed the patient with the resident/fellow. I agree with the resident/fellow's medical decision making as documented in the note.    Kasia Mayfield MD

## 2025-04-17 NOTE — RESULT ENCOUNTER NOTE
Patient is anemic. Add iron studies and ferritin.   The hemoglobin is trending down. She should also see her PCP

## 2025-04-17 NOTE — TELEPHONE ENCOUNTER
Called patient to discuss above. Agreeable to further anemia work-up. Will also discuss anemia management with PCP. Feels fatigued but otherwise asymptomatic at this time. Will route encounter to PCP for awareness.

## 2025-04-23 ENCOUNTER — TELEPHONE (OUTPATIENT)
Dept: RHEUMATOLOGY | Facility: CLINIC | Age: 45
End: 2025-04-23
Payer: COMMERCIAL

## 2025-04-23 LAB
FERRITIN SERPL-MCNC: 2 NG/ML (ref 16–232)
FOLATE SERPL-MCNC: 12 NG/ML
IRON SATN MFR SERPL: 4 % (CALC) (ref 16–45)
IRON SERPL-MCNC: 18 MCG/DL (ref 40–190)
TIBC SERPL-MCNC: 419 MCG/DL (CALC) (ref 250–450)
VIT B12 SERPL-MCNC: 815 PG/ML (ref 200–1100)

## 2025-04-23 NOTE — TELEPHONE ENCOUNTER
----- Message from Barbara Estrada sent at 4/23/2025 11:33 AM EDT -----  Regarding: Fax Lab Order  Hi ladies! I was wondering if you could help me fax over lab orders for this patient- Dr. Mayfield wanted an iron, TIBC, and ferritin done on this patient. [facility] only collected the B12 and folate even though orders were in for all the above. [facility] has a blood sample from 4/21 but are requesting the orders to be faxed over for the add on. They said electronic order is not ok as that would go to the phlebotomist. Could you please help with this?Fax number is [phone].Thank you!Barbara

## 2025-04-23 NOTE — TELEPHONE ENCOUNTER
Call placed to [facility] to make aware of 2 tests that were ordered and not ran of Iron TIBC and Ferritin. This nurse spoke to Rachael to place add on order for both. Provider, Dr. Estrada made aware.

## 2025-04-28 ENCOUNTER — TELEPHONE (OUTPATIENT)
Dept: RHEUMATOLOGY | Facility: CLINIC | Age: 45
End: 2025-04-28
Payer: COMMERCIAL

## 2025-04-28 NOTE — TELEPHONE ENCOUNTER
Attempted to call patient to discuss lab results, iron studies low with normal B12 and folate levels (suggesting iron deficiency as cause of anemia), recommend PCP follow-up for management. Patient not available, daughter requesting calling back later.

## 2025-04-29 ENCOUNTER — TELEPHONE (OUTPATIENT)
Dept: RHEUMATOLOGY | Facility: CLINIC | Age: 45
End: 2025-04-29
Payer: COMMERCIAL

## 2025-04-29 NOTE — TELEPHONE ENCOUNTER
Attempted to call patient to discuss vitamin B12 and folate results, patient again not available. Patient's  requested call back tomorrow before 12 pm.

## 2025-04-29 NOTE — TELEPHONE ENCOUNTER
Placed call to patient, no answer. Left voicemail for patient to return call to office to discuss lab results. Patient provided with office number.

## 2025-05-29 ENCOUNTER — APPOINTMENT (OUTPATIENT)
Dept: PRIMARY CARE | Facility: CLINIC | Age: 45
End: 2025-05-29
Payer: COMMERCIAL

## 2025-06-03 ENCOUNTER — APPOINTMENT (OUTPATIENT)
Dept: PRIMARY CARE | Facility: CLINIC | Age: 45
End: 2025-06-03
Payer: COMMERCIAL

## 2025-06-03 DIAGNOSIS — E66.812 CLASS 2 OBESITY DUE TO EXCESS CALORIES WITHOUT SERIOUS COMORBIDITY WITH BODY MASS INDEX (BMI) OF 35.0 TO 35.9 IN ADULT: Primary | ICD-10-CM

## 2025-06-03 DIAGNOSIS — E66.09 CLASS 2 OBESITY DUE TO EXCESS CALORIES WITHOUT SERIOUS COMORBIDITY WITH BODY MASS INDEX (BMI) OF 35.0 TO 35.9 IN ADULT: Primary | ICD-10-CM

## 2025-06-03 RX ORDER — PHENTERMINE HYDROCHLORIDE 37.5 MG/1
37.5 TABLET ORAL
Qty: 30 TABLET | Refills: 0 | Status: SHIPPED | OUTPATIENT
Start: 2025-06-03

## 2025-06-03 NOTE — TELEPHONE ENCOUNTER
LOV: 2024 [id number]  PT HAD APPOINTMENT SCHEDULED FOR TODAY, BUT WAS UNABLE TO GET . PT RESCHEDULED APPOINTMENT.   [facility]

## 2025-06-09 DIAGNOSIS — E03.9 ACQUIRED HYPOTHYROIDISM: ICD-10-CM

## 2025-06-09 RX ORDER — LEVOTHYROXINE SODIUM 100 UG/1
TABLET ORAL
Qty: 30 TABLET | Refills: 5 | Status: SHIPPED | OUTPATIENT
Start: 2025-06-09

## 2025-06-10 ENCOUNTER — APPOINTMENT (OUTPATIENT)
Dept: PRIMARY CARE | Facility: CLINIC | Age: 45
End: 2025-06-10
Payer: COMMERCIAL

## 2025-07-07 DIAGNOSIS — F51.01 PRIMARY INSOMNIA: Primary | ICD-10-CM

## 2025-07-07 RX ORDER — TRAZODONE HYDROCHLORIDE 50 MG/1
TABLET ORAL
Qty: 90 TABLET | Refills: 0 | Status: SHIPPED | OUTPATIENT
Start: 2025-07-07

## 2025-07-14 DIAGNOSIS — E66.812 CLASS 2 OBESITY DUE TO EXCESS CALORIES WITHOUT SERIOUS COMORBIDITY WITH BODY MASS INDEX (BMI) OF 35.0 TO 35.9 IN ADULT: Primary | ICD-10-CM

## 2025-07-14 DIAGNOSIS — E66.09 CLASS 2 OBESITY DUE TO EXCESS CALORIES WITHOUT SERIOUS COMORBIDITY WITH BODY MASS INDEX (BMI) OF 35.0 TO 35.9 IN ADULT: Primary | ICD-10-CM

## 2025-07-14 RX ORDER — PHENTERMINE HYDROCHLORIDE 37.5 MG/1
37.5 TABLET ORAL
Qty: 30 TABLET | Refills: 0 | Status: SHIPPED | OUTPATIENT
Start: 2025-07-14

## 2025-07-17 ENCOUNTER — APPOINTMENT (OUTPATIENT)
Dept: PRIMARY CARE | Facility: CLINIC | Age: 45
End: 2025-07-17
Payer: COMMERCIAL

## 2025-08-04 DIAGNOSIS — E66.812 CLASS 2 OBESITY DUE TO EXCESS CALORIES WITHOUT SERIOUS COMORBIDITY WITH BODY MASS INDEX (BMI) OF 35.0 TO 35.9 IN ADULT: ICD-10-CM

## 2025-08-04 DIAGNOSIS — E66.09 CLASS 2 OBESITY DUE TO EXCESS CALORIES WITHOUT SERIOUS COMORBIDITY WITH BODY MASS INDEX (BMI) OF 35.0 TO 35.9 IN ADULT: ICD-10-CM

## 2025-08-04 RX ORDER — PHENTERMINE HYDROCHLORIDE 37.5 MG/1
37.5 TABLET ORAL
Qty: 30 TABLET | Refills: 0 | Status: SHIPPED | OUTPATIENT
Start: 2025-08-04

## 2025-08-05 NOTE — PROGRESS NOTES
Recall  Tiny Malik is a 45 yo F w/a history of ARAM, obesity, hypothyroidism, anxiety, nephrolithiasis, and diverticulosis referred by PCP for suspected rheumatoid arthritis. Saw PCP 01/18/24 at which time she had polyarthralgia relieved by Decadron.     Previous labs:  RF > 600,   ESR 35  Negative GERALD, CBC w/microcytic anemia and mild lymphopenia.     Saw heme/onc 11/2024 for monoclonal IgG kappa in the gamma region a level too low to quantitate, suggest further evaluation for diagnosis of plasma cell dyscrasia seen on SPEP incidentally  Per heme/onc, monoclonal IgG kappa in the gamma region at a level too low to quantitate, CBC unremarkable: DC from Heme.    Subjective     Patient ID: [id number]   Tiny Malik is a 44 y.o. female who presents for follow-up of seropositive RA.    Current IST  Methotrexate 6 tabs weekly with daily folate - started with 4 tabs from 3/2024 + daily FA    Previous IST  Prednisone 10 mg daily - 3/2024-6/2024    Interval History  Last seen 4/2025  Has been out of methotrexate for 2-3 weeks  Has had slightly more aching in MCPs since she has been off the medication  Not really any AM stiffness  No joint swelling    Still having xerostomia mostly at night  No new dental carries  No dry eyes  No chest pain, no SOB    Sometimes having heavy menstruation  No blood in stools  Reports eating meat  Will discuss iron deficiency with PCP at appointment next week    Review of Systems  As per HPI    Objective    Vitals:    08/06/25 1315   BP: 95/60   Pulse: 79   Temp: 36.6 °C (97.9 °F)   SpO2: 97%     Physical Exam  Decreased salivary pooling    General: Pleasant, cooperative, in NAD   Eyes: Conjunctiva clear, sclera white, anicteric   Lungs: No respiratory distress; lungs CTAB; no wheezes, rales, rhonchi, or stridor   Heart: Normal S1 and S2; regular rate and rhythm; no murmurs, rubs, or gallops  Skin: No rashes, ulcers, tophi, or nodules noted  MSK:   Upper Extremities:   Hands: No  "erythema, warmth, synovitis, or pain of the DIPs, PIPs, or MCPs; normal ROM  Wrists: No erythema, warmth, synovitis, or pain of the wrists; normal ROM  Elbows: No erythema, warmth, synovitis, or pain; normal ROM   Shoulders: No erythema, warmth, appreciable swelling, or pain; normal ROM   Lower Extremities:   Knees: No erythema, warmth, swelling, or pain; normal ROM   Ankles: No erythema, warmth, synovitis, or pain; normal ROM    Last BMP:   Lab Results   Component Value Date     04/16/2025    K 4.2 04/16/2025     04/16/2025    CO2 26 04/16/2025    BUN 17 04/16/2025    CREATININE 0.62 04/16/2025    CALCIUM 8.8 04/16/2025     Last CBC: No components found for: \"CBC\"  Last CRP:   C-REACTIVE PROTEIN (mg/L)   Date Value   04/16/2025 <3.0     Last ESR:   SED RATE BY MODIFIED WESTERGREN (mm/h)   Date Value   04/16/2025 2     Last Hepatic Function Results:   BILIRUBIN, TOTAL (mg/dL)   Date Value   04/16/2025 0.2     ALT (U/L)   Date Value   04/16/2025 14     AST (U/L)   Date Value   04/16/2025 15     ALKALINE PHOSPHATASE (U/L)   Date Value   04/16/2025 58     ALBUMIN (g/dL)   Date Value   04/16/2025 4.1     PROTEIN, TOTAL (g/dL)   Date Value   04/16/2025 6.5       Assessment/Plan   44 y.o. woman who presents for follow-up of seropositive (RF >600, ACPA 237) RA.    Seropositive rheumatoid arthritis, in remission  Strongly seropositive- RF > 600, ACPA 237   CDAI 0 today and last time  No synovitis on exam  Baseline xrays without any JSN or erosions  Advised to reach out in case of a flare  Continue current dose of methotrexate (6 tabs/week), daily FA. Refilled today. Provided patient office number to prevent medication lapses in future  4/2025 surveillance labs reviewed and stable  Update CBC, CMP, inflammatory markers with PCP labs next week    Xerostomia, stable  Has moderate xerostomia, but no keratoconjunctivitis symptoms  Work-up for secondary Sjogrens (SSA/SSB negative) showed SPEP with monoclonal IgG kappa " in the gamma region a level too low to quantitate, suggest further evaluation for diagnosis of plasma cell dyscrasia (but per heme onc, no further workup given normal CBC), normal complement C3 + C4, parotid ultrasound with some lymphadenopathy but not suggestive of Sjogren's  Advised chewing gum, hard candies, frequent sips of water, XyliMelts  Discussed starting pilocarpine, patient prefers to try OTC treatments first but will notify us if she would like to start pilocarpine. Medication information sheet provided.  Recommended dental hygiene and follow up with dentist    Iron deficiency anemia, etiology under evaluation  Patient has previously been intermittently iron deficient since 2023  On discussion today, reports that she sometimes has heavy menstruation  Encouraged patient to talk with PCP next week for further workup and need for resuming iron supplementation    Cervicalgia, resolved  1/2025 C-spine XR: mild DJD, no signs of atlantoaxial subluxation  Likely multifactorial - post-traumatic and degenerative  Taking Flexeril 10 mg PRN at bedtime- rarely  Can continue OTC lidocaine patches      Advised updating vaccination status- COVID, flu, pneumococcal  Malignancy screening reinforced    Patient seen and discussed with Dr. Mayfield.    Barbara Estrada MD  Rheumatology Fellow, PGY-5    Orders Placed This Encounter   Procedures    CBC and Auto Differential    Sedimentation Rate    C-Reactive Protein    Comprehensive metabolic panel

## 2025-08-06 ENCOUNTER — APPOINTMENT (OUTPATIENT)
Dept: RHEUMATOLOGY | Facility: CLINIC | Age: 45
End: 2025-08-06
Payer: COMMERCIAL

## 2025-08-06 VITALS
BODY MASS INDEX: 35.62 KG/M2 | WEIGHT: 199.8 LBS | DIASTOLIC BLOOD PRESSURE: 60 MMHG | SYSTOLIC BLOOD PRESSURE: 95 MMHG | OXYGEN SATURATION: 97 % | TEMPERATURE: 97.9 F | HEART RATE: 79 BPM

## 2025-08-06 DIAGNOSIS — R76.8 CYCLIC CITRULLINATED PEPTIDE (CCP) ANTIBODY POSITIVE: ICD-10-CM

## 2025-08-06 DIAGNOSIS — D64.9 ANEMIA, UNSPECIFIED TYPE: ICD-10-CM

## 2025-08-06 DIAGNOSIS — M05.79 RHEUMATOID ARTHRITIS INVOLVING MULTIPLE SITES WITH POSITIVE RHEUMATOID FACTOR (MULTI): Primary | ICD-10-CM

## 2025-08-06 DIAGNOSIS — Z79.69 LONG TERM (CURRENT) USE OF OTHER IMMUNOMODULATORS AND IMMUNOSUPPRESSANTS: ICD-10-CM

## 2025-08-06 PROCEDURE — 99214 OFFICE O/P EST MOD 30 MIN: CPT | Performed by: STUDENT IN AN ORGANIZED HEALTH CARE EDUCATION/TRAINING PROGRAM

## 2025-08-06 RX ORDER — FOLIC ACID 1 MG/1
1 TABLET ORAL DAILY
Qty: 90 TABLET | Refills: 1 | Status: SHIPPED | OUTPATIENT
Start: 2025-08-06 | End: 2026-02-02

## 2025-08-06 RX ORDER — METHOTREXATE 2.5 MG/1
15 TABLET ORAL
Qty: 80 TABLET | Refills: 1 | Status: SHIPPED | OUTPATIENT
Start: 2025-08-10

## 2025-08-06 NOTE — PROGRESS NOTES
I saw and evaluated the patient. I personally obtained the key and critical portions of the history and physical exam or was physically present for key and critical portions performed by the resident/fellow. I reviewed the resident/fellow's documentation and discussed the patient with the resident/fellow. I agree with the resident/fellow's medical decision making as documented in the note.  Has iron deficiency anemia.    Needs to follow up with PCP    Kasia Mayfield MD

## 2025-08-12 ENCOUNTER — APPOINTMENT (OUTPATIENT)
Dept: PRIMARY CARE | Facility: CLINIC | Age: 45
End: 2025-08-12
Payer: COMMERCIAL

## 2025-08-12 VITALS
TEMPERATURE: 98.2 F | OXYGEN SATURATION: 100 % | HEART RATE: 76 BPM | WEIGHT: 199.6 LBS | DIASTOLIC BLOOD PRESSURE: 66 MMHG | BODY MASS INDEX: 36.73 KG/M2 | SYSTOLIC BLOOD PRESSURE: 130 MMHG | HEIGHT: 62 IN

## 2025-08-12 DIAGNOSIS — D50.8 OTHER IRON DEFICIENCY ANEMIA: Primary | ICD-10-CM

## 2025-08-12 DIAGNOSIS — F51.01 PRIMARY INSOMNIA: ICD-10-CM

## 2025-08-12 DIAGNOSIS — E03.9 ACQUIRED HYPOTHYROIDISM: ICD-10-CM

## 2025-08-12 DIAGNOSIS — Z12.31 ENCOUNTER FOR SCREENING MAMMOGRAM FOR MALIGNANT NEOPLASM OF BREAST: ICD-10-CM

## 2025-08-12 DIAGNOSIS — D64.9 ANEMIA, UNSPECIFIED TYPE: Primary | ICD-10-CM

## 2025-08-12 DIAGNOSIS — E88.810 METABOLIC SYNDROME: ICD-10-CM

## 2025-08-12 DIAGNOSIS — D50.8 OTHER IRON DEFICIENCY ANEMIA: ICD-10-CM

## 2025-08-12 DIAGNOSIS — M05.79 RHEUMATOID ARTHRITIS INVOLVING MULTIPLE SITES WITH POSITIVE RHEUMATOID FACTOR (MULTI): ICD-10-CM

## 2025-08-12 DIAGNOSIS — F41.9 ANXIETY: ICD-10-CM

## 2025-08-12 PROCEDURE — 3008F BODY MASS INDEX DOCD: CPT | Performed by: FAMILY MEDICINE

## 2025-08-12 PROCEDURE — 99214 OFFICE O/P EST MOD 30 MIN: CPT | Performed by: FAMILY MEDICINE

## 2025-08-12 RX ORDER — TRAZODONE HYDROCHLORIDE 100 MG/1
100 TABLET ORAL NIGHTLY
Qty: 90 TABLET | Refills: 3 | Status: SHIPPED | OUTPATIENT
Start: 2025-08-12 | End: 2026-08-12

## 2025-08-12 RX ORDER — NALTREXONE HYDROCHLORIDE 50 MG/1
50 TABLET, FILM COATED ORAL DAILY
Qty: 30 TABLET | Refills: 11 | Status: SHIPPED | OUTPATIENT
Start: 2025-08-12 | End: 2026-08-12

## 2025-08-12 ASSESSMENT — ENCOUNTER SYMPTOMS
DYSURIA: 0
SORE THROAT: 0
HEADACHES: 0
FEVER: 0
NAUSEA: 0
FATIGUE: 0
CONSTIPATION: 0
ABDOMINAL PAIN: 0
DIARRHEA: 0
SHORTNESS OF BREATH: 0
CHILLS: 0
COUGH: 0
VOMITING: 0

## 2025-08-12 ASSESSMENT — LIFESTYLE VARIABLES: HOW OFTEN DO YOU HAVE A DRINK CONTAINING ALCOHOL: NEVER

## 2025-08-12 ASSESSMENT — PAIN SCALES - GENERAL: PAINLEVEL_OUTOF10: 0-NO PAIN

## 2025-08-13 LAB
ALBUMIN SERPL-MCNC: 4.3 G/DL (ref 3.6–5.1)
ALP SERPL-CCNC: 61 U/L (ref 31–125)
ALT SERPL-CCNC: 20 U/L (ref 6–29)
ANION GAP SERPL CALCULATED.4IONS-SCNC: 6 MMOL/L (CALC) (ref 7–17)
AST SERPL-CCNC: 19 U/L (ref 10–35)
BASOPHILS # BLD AUTO: 59 CELLS/UL (ref 0–200)
BASOPHILS NFR BLD AUTO: 0.9 %
BILIRUB SERPL-MCNC: 0.2 MG/DL (ref 0.2–1.2)
BUN SERPL-MCNC: 18 MG/DL (ref 7–25)
CALCIUM SERPL-MCNC: 9.2 MG/DL (ref 8.6–10.2)
CHLORIDE SERPL-SCNC: 104 MMOL/L (ref 98–110)
CO2 SERPL-SCNC: 26 MMOL/L (ref 20–32)
CREAT SERPL-MCNC: 0.66 MG/DL (ref 0.5–0.99)
CRP SERPL-MCNC: <3 MG/L
EGFRCR SERPLBLD CKD-EPI 2021: 109 ML/MIN/1.73M2
EOSINOPHIL # BLD AUTO: 117 CELLS/UL (ref 15–500)
EOSINOPHIL NFR BLD AUTO: 1.8 %
ERYTHROCYTE [DISTWIDTH] IN BLOOD BY AUTOMATED COUNT: 16.1 % (ref 11–15)
ERYTHROCYTE [SEDIMENTATION RATE] IN BLOOD BY WESTERGREN METHOD: 6 MM/H
FERRITIN SERPL-MCNC: 3 NG/ML (ref 16–232)
GLUCOSE SERPL-MCNC: 97 MG/DL (ref 65–99)
HCT VFR BLD AUTO: 33.4 % (ref 35–45)
HGB BLD-MCNC: 9.8 G/DL (ref 11.7–15.5)
IRON SATN MFR SERPL: 4 % (CALC) (ref 16–45)
IRON SERPL-MCNC: 17 MCG/DL (ref 40–190)
LYMPHOCYTES # BLD AUTO: 1424 CELLS/UL (ref 850–3900)
LYMPHOCYTES NFR BLD AUTO: 21.9 %
MCH RBC QN AUTO: 24.3 PG (ref 27–33)
MCHC RBC AUTO-ENTMCNC: 29.3 G/DL (ref 32–36)
MCV RBC AUTO: 82.7 FL (ref 80–100)
MONOCYTES # BLD AUTO: 520 CELLS/UL (ref 200–950)
MONOCYTES NFR BLD AUTO: 8 %
NEUTROPHILS # BLD AUTO: 4381 CELLS/UL (ref 1500–7800)
NEUTROPHILS NFR BLD AUTO: 67.4 %
PLATELET # BLD AUTO: 329 THOUSAND/UL (ref 140–400)
PMV BLD REES-ECKER: 10.3 FL (ref 7.5–12.5)
POTASSIUM SERPL-SCNC: 4 MMOL/L (ref 3.5–5.3)
PROT SERPL-MCNC: 7 G/DL (ref 6.1–8.1)
RBC # BLD AUTO: 4.04 MILLION/UL (ref 3.8–5.1)
SODIUM SERPL-SCNC: 136 MMOL/L (ref 135–146)
T4 FREE SERPL-MCNC: 1.2 NG/DL (ref 0.8–1.8)
TIBC SERPL-MCNC: 442 MCG/DL (CALC) (ref 250–450)
TSH SERPL-ACNC: 0.94 MIU/L
WBC # BLD AUTO: 6.5 THOUSAND/UL (ref 3.8–10.8)

## 2025-08-18 ENCOUNTER — APPOINTMENT (OUTPATIENT)
Dept: RADIOLOGY | Facility: HOSPITAL | Age: 45
End: 2025-08-18
Payer: COMMERCIAL

## 2025-08-18 DIAGNOSIS — E03.9 ACQUIRED HYPOTHYROIDISM: ICD-10-CM

## 2025-08-18 RX ORDER — LEVOTHYROXINE SODIUM 125 UG/1
125 TABLET ORAL DAILY
Qty: 90 TABLET | Refills: 3 | Status: SHIPPED | OUTPATIENT
Start: 2025-08-18 | End: 2026-08-18

## 2025-09-11 ENCOUNTER — APPOINTMENT (OUTPATIENT)
Dept: SURGERY | Facility: CLINIC | Age: 45
End: 2025-09-11
Payer: COMMERCIAL

## 2025-12-31 ENCOUNTER — APPOINTMENT (OUTPATIENT)
Dept: RHEUMATOLOGY | Facility: CLINIC | Age: 45
End: 2025-12-31
Payer: COMMERCIAL